# Patient Record
Sex: MALE | Race: WHITE | Employment: FULL TIME | URBAN - METROPOLITAN AREA
[De-identification: names, ages, dates, MRNs, and addresses within clinical notes are randomized per-mention and may not be internally consistent; named-entity substitution may affect disease eponyms.]

---

## 2021-03-05 NOTE — PROGRESS NOTES
The [facility] / [facility] [place] ([facility]) [address] [facility] [address] [facility], [facility] [facility]    Acknowledgment of Informed Consent for Surgical or Medical Procedure and Sedation  I agree to allow doctor(s) Jeana Franco and SEAN Crain his/her associates or assistants, including residents and/or other qualified medical practitioner to perform the following medical treatment or procedure and to administer or direct the administration of sedation as necessary:  Procedure(s): C3-4, C4-5, C5-6, C6-7, T1-2 FUSION AND FIXATION, C6-C7, T1-T2 DECOMPRESSION  My doctor has explained the following regarding the proposed procedure:   the explanation of the procedure   the benefits of the procedure   the potential problems that might occur during recuperation   the risks and side effects of the procedure which could include but are not limited to severe blood loss, infection, stroke or death   the benefits, risks and side effect of alternative procedures including the consequences of declining this procedure or any alternative procedures   the likelihood of achieving satisfactory results. I acknowledge no guarantee or assurance has been made to me regarding the results. I understand that during the course of this treatment/procedure, unforeseen conditions can occur which require an additional or different procedure. I agree to allow my physician or assistants to perform such extension of the original procedure as they may find necessary. I understand that sedation will often result in temporary impairment of memory and fine motor skills and that sedation can occasionally progress to a state of deep sedation or general anesthesia. I understand the risks of anesthesia for surgery include, but are not limited to, sore throat, hoarseness, injury to face, mouth, or teeth; nausea; headache; injury to blood vessels or nerves; death, brain damage, or paralysis.     I understand that if I have a Limitation of Treatment order in effect during my hospitalization, the order may or may not be in effect during this procedure. I give my doctor permission to give me blood or blood products. I understand that there are risks with receiving blood such as hepatitis, AIDS, fever, or allergic reaction. I acknowledge that the risks, benefits, and alternatives of this treatment have been explained to me and that no express or implied warranty has been given by the hospital, any blood bank, or any person or entity as to the blood or blood components transfused. At the discretion of my doctor, I agree to allow observers, equipment/product representatives and allow photographing, and/or televising of the procedure, provided my name or identity is maintained confidentially. I agree the hospital may dispose of or use for scientific or educational purposes any tissue, fluid, or body parts which may be removed.     ________________________________Date________Time______ am/pm  ([place] [facility])  Patient or Signature of Closest Relative or Legal Guardian    ________________________________Date________Time______am/pm      Page 1 of  1  Witness

## 2021-03-09 ENCOUNTER — HOSPITAL ENCOUNTER (OUTPATIENT)
Age: 56
Discharge: HOME | End: 2021-03-09
Payer: COMMERCIAL

## 2021-03-09 PROCEDURE — 93005 ELECTROCARDIOGRAM TRACING: CPT | Performed by: PHYSICIAN ASSISTANT

## 2021-03-10 LAB
EKG ATRIAL RATE: 99 BPM
EKG DIAGNOSIS: NORMAL
EKG P AXIS: 55 DEGREES
EKG P-R INTERVAL: 160 MS
EKG Q-T INTERVAL: 350 MS
EKG QRS DURATION: 88 MS
EKG QTC CALCULATION (BAZETT): 449 MS
EKG R AXIS: 7 DEGREES
EKG T AXIS: 16 DEGREES
EKG VENTRICULAR RATE: 99 BPM

## 2021-03-10 PROCEDURE — 93010 ELECTROCARDIOGRAM REPORT: CPT | Performed by: INTERNAL MEDICINE

## 2021-03-12 ENCOUNTER — OFFICE VISIT (OUTPATIENT)
Dept: PRIMARY CARE CLINIC | Age: 56
End: 2021-03-12
Payer: COMMERCIAL

## 2021-03-12 DIAGNOSIS — Z01.818 PREOP EXAMINATION: Primary | ICD-10-CM

## 2021-03-12 PROCEDURE — 99211 OFF/OP EST MAY X REQ PHY/QHP: CPT | Performed by: NURSE PRACTITIONER

## 2021-03-13 LAB — SARS-COV-2: NOT DETECTED

## 2021-03-15 RX ORDER — LORATADINE 10 MG/1
TABLET ORAL
COMMUNITY
Start: 2021-02-26

## 2021-03-15 RX ORDER — MELOXICAM 15 MG/1
TABLET ORAL
COMMUNITY
Start: 2020-11-25 | End: 2021-03-15

## 2021-03-15 RX ORDER — OXYCODONE HYDROCHLORIDE AND ACETAMINOPHEN 5; 325 MG/1; MG/1
TABLET ORAL
COMMUNITY
Start: 2021-01-15 | End: 2021-03-15

## 2021-03-15 RX ORDER — NICOTINE POLACRILEX 4 MG/1
GUM, CHEWING ORAL
COMMUNITY
Start: 2021-02-26

## 2021-03-15 RX ORDER — M-VIT,TX,IRON,MINS/CALC/FOLIC 27MG-0.4MG
1 TABLET ORAL DAILY
Status: ON HOLD | COMMUNITY
End: 2021-03-21 | Stop reason: HOSPADM

## 2021-03-15 RX ORDER — ACETAMINOPHEN 325 MG/1
325 TABLET ORAL EVERY 4 HOURS
COMMUNITY
End: 2021-03-15

## 2021-03-15 RX ORDER — GABAPENTIN 300 MG/1
300 CAPSULE ORAL 3 TIMES DAILY
COMMUNITY

## 2021-03-15 RX ORDER — MOMETASONE FUROATE 50 UG/1
2 SPRAY, METERED NASAL
Status: ON HOLD | COMMUNITY
End: 2021-03-21 | Stop reason: HOSPADM

## 2021-03-15 RX ORDER — FENOFIBRATE 160 MG/1
160 TABLET ORAL DAILY
COMMUNITY
Start: 2020-05-22

## 2021-03-15 NOTE — PROGRESS NOTES
SPOKE WITH ALETHA AT PCP, AWARE OF LABS, AWARE NEED CLEARANCE FOR LABS.   STATES WILL OBTAIN AND FAX

## 2021-03-15 NOTE — PROGRESS NOTES
[address] [facility] patients having surgery or anesthesia are required to be Covid tested. You will need to quarantine from the time you are tested until your surgery. PRIOR TO PROCEDURE DATE:        1. PLEASE FOLLOW ANY  GUIDELINES/ INSTRUCTIONS PRIOR TO YOUR PROCEDURE AS ADVISED BY YOUR SURGEON. 2. Arrange for someone to drive you home and be with you for the first 24 hours after discharge for your safety after your procedure for which you received sedation. Ensure it is someone we can share information with regarding your discharge. 3. You must contact your surgeon for instructions IF:   You are taking any blood thinners, aspirin, anti-inflammatory or vitamin E.   There is a change in your physical condition such as a cold, fever, rash, cuts, sores or any other infection, especially near your surgical site. 4. Do not drink alcohol the day before or day of your procedure. 5. A Pre-op History and Physical for surgery MUST be completed by your Physician or Urgent Care within 30 days of your procedure date. Please bring a copy with you on the day of your procedure and along with any other testing performed. THE DAY OF YOUR PROCEDURE:  1. Follow instructions for ARRIVAL TIME as DIRECTED BY YOUR SURGEON. I    1. Enter the MAIN entrance from [address] and follow the signs to the free [employer] or [employer] parking (offered free of charge 6am-5pm). 2. Enter the Main Entrance of the hospital (do not enter from the lower level of the parking garage). Upon entrance, check in with the  at the main desk on your left. If no one is available at the desk, proceed into the [facility] Waiting Room and go through the door directly into the [facility]. There is a Check-in desk ACROSS from Room 5 (marked with a sign hanging from the ceiling).  The phone number for the surgery center is [phone]. 4. Please call [phone] option #2 option #2 if you have not been preregistered yet. On the day of your procedure bring your insurance card and photo ID. You will be registered at your bedside once brought back to your room. 5. DO NOT EAT ANYTHING eight hours prior to your arrival for surgery. May have 8 ounces of water 4 hours prior to your arrival for surgery. NOTE: ALL Gastric, Bariatric and Bowel surgery patients MUST follow their surgeon's instructions. 6. MEDICATIONS    Take the following medications with a SMALL sip of water: gabapentin, claritin, omperazole   Use your usual dose of inhalers the morning of surgery. BRING your rescue inhaler with you to hospital.    Anesthesia does NOT want you to take insulin the morning of surgery. They will control your blood sugar while you are at the hospital. Please contact your ordering physician for instructions regarding your insulin the night before your procedure. If you have an insulin pump, please keep it set on basal rate. 7. Do not swallow water when brushing teeth. No gum, candy, mints or ice chips. Refrain from smoking or at least decrease the amount. 8. Dress in loose, comfortable clothing appropriate for redressing after your procedure. Do not wear jewelry (including body piercings), make-up (especially NO eye make-up), fingernail polish (NO toenail polish if foot/leg surgery), lotion, powders or metal hairclips. 9. Dentures, glasses, or contacts will need to be removed before your procedure. Bring cases for your glasses, contacts, dentures, or hearing aids to protect them while you are in surgery. 10. If you use a CPAP, please bring it with you on the day of your procedure. 11. We recommend that valuable personal  belongings such as cash, cell phones, e-tablets or jewelry, be left at home during your stay. The hospital will not be responsible for valuables that are not secured in the hospital safe.  However, if your insurance requires a co-pay, you may want to bring a method of payment, i.e. Check or credit card, if you wish to pay your co-pay the day of surgery. 12. If you are to stay overnight, you may bring a bag with personal items. Please have any large items you may need brought in by your family after your arrival to your hospital room. 15. If you have a Living Will or Durable Power of , please bring a copy on the day of your procedure. 15. With your permission, one family member may accompany you while you are being prepared for surgery. Once you are ready, additional family members may join you. HOW WE KEEP YOU SAFE and WORK TO PREVENT SURGICAL SITE INFECTIONS:  1. Health care workers should always check your ID bracelet to verify your name and birth date. You will be asked many times to state your name, date of birth, and allergies. 2. Health care workers should always clean their hands with soap or alcohol gel before providing care to you. It is okay to ask anyone if they cleaned their hands before they touch you. 3. You will be actively involved in verifying the type of procedure you are having and ensuring the correct surgical site. This will be confirmed multiple times prior to your procedure. Do NOT jeovanny your surgery site UNLESS instructed to by your surgeon. 4. Do not shave or wax for 72 hours prior to procedure near your operative site. Shaving with a razor can irritate your skin and make it easier to develop an infection. On the day of your procedure, any hair that needs to be removed near the surgical site will be clipped by a healthcare worker using a special clippers designed to avoid skin irritation. 5. When you are in the operating room, your surgical site will be cleansed with a special soap, and in most cases, you will be given an antibiotic before the surgery begins. What to expect AFTER YOUR PROCEDURE:  1.  Immediately following your procedure, your will be taken to the PACU for the first phase of your recovery. Your nurse will help you recover from any potential side effects of anesthesia, such as extreme drowsiness, changes in your vital signs or breathing patterns. Nausea, headache, muscle aches, or sore throat may also occur after anesthesia. Your nurse will help you manage these potential side effects. 2. For comfort and safety, arrange to have someone at home with you for the first 24 hours after discharge. 3. You and your family will be given written instructions about your diet, activity, dressing care, medications, and return visits. 4. Once at home, should issues with nausea, pain, or bleeding occur, or should you notice any signs of infection, you should call your surgeon. 5. Always clean your hands before and after caring for your wound. Do not let your family touch your surgery site without cleaning their hands. 6. Narcotic pain medications can cause significant constipation. You may want to add a stool softener to your postoperative medication schedule or speak to your surgeon on how best to manage this SIDE EFFECT. SPECIAL INSTRUCTIONS     Thank you for allowing us to care for you. We strive to exceed your expectations in the delivery of care and service provided to you and your family. If you need to contact the [place] [address] staff for any reason, please call us at [phone]    Instructions reviewed with patient during preadmission testing phone interview. Filippo Ventura. 3/15/2021 .10:15 AM      ADDITIONAL EDUCATIONAL INFORMATION REVIEWED PER PHONE WITH YOU AND/OR YOUR FAMILY:  No Bring a urine sample on day of surgery  Yes Hibiclens® Bathing Instructions   No Antibacterial Soap

## 2021-03-16 NOTE — PROGRESS NOTES
The [facility] / [address] [facility], [address]    Acknowledgment of Informed Consent for Surgical or Medical Procedure and Sedation  I agree to allow doctor(s) Vikas Pickett and his/her associates or assistants, including residents and/or other qualified medical practitioner to perform the following medical treatment or procedure and to administer or direct the administration of sedation as necessary:  Procedure(s): C3-4, C4-5, C5-6, C6-7, T1-T2 FUSION AND FIXATION, C6-C7, T1-T2 DECOMPRESSION      My doctor has explained the following regarding the proposed procedure:   the explanation of the procedure   the benefits of the procedure   the potential problems that might occur during recuperation   the risks and side effects of the procedure which could include but are not limited to severe blood loss, infection, stroke or death   the benefits, risks and side effect of alternative procedures including the consequences of declining this procedure or any alternative procedures   the likelihood of achieving satisfactory results. I acknowledge no guarantee or assurance has been made to me regarding the results. I understand that during the course of this treatment/procedure, unforeseen conditions can occur which require an additional or different procedure. I agree to allow my physician or assistants to perform such extension of the original procedure as they may find necessary. I understand that sedation will often result in temporary impairment of memory and fine motor skills and that sedation can occasionally progress to a state of deep sedation or general anesthesia. I understand the risks of anesthesia for surgery include, but are not limited to, sore throat, hoarseness, injury to face, mouth, or teeth; nausea; headache; injury to blood vessels or nerves; death, brain damage, or paralysis.     I understand that if I have a Limitation of Treatment order in effect during my hospitalization, the order may or may not be in effect during this procedure. I give my doctor permission to give me blood or blood products. I understand that there are risks with receiving blood such as hepatitis, AIDS, fever, or allergic reaction. I acknowledge that the risks, benefits, and alternatives of this treatment have been explained to me and that no express or implied warranty has been given by the hospital, any blood bank, or any person or entity as to the blood or blood components transfused. At the discretion of my doctor, I agree to allow observers, equipment/product representatives and allow photographing, and/or televising of the procedure, provided my name or identity is maintained confidentially. I agree the hospital may dispose of or use for scientific or educational purposes any tissue, fluid, or body parts which may be removed.     ________________________________Date________Time______ am/pm  ([place] [facility])  Patient or Signature of Closest Relative or Legal Guardian    ________________________________Date________Time______am/pm      Page 1 of  1  Witness

## 2021-03-17 ENCOUNTER — ANESTHESIA EVENT (OUTPATIENT)
Dept: OPERATING ROOM | Age: 56
DRG: 460 | End: 2021-03-17
Payer: COMMERCIAL

## 2021-03-18 ENCOUNTER — HOSPITAL ENCOUNTER (INPATIENT)
Age: 56
LOS: 3 days | Discharge: HOME | DRG: 460 | End: 2021-03-21
Attending: NEUROLOGICAL SURGERY | Admitting: NEUROLOGICAL SURGERY
Payer: COMMERCIAL

## 2021-03-18 ENCOUNTER — APPOINTMENT (OUTPATIENT)
Dept: CT IMAGING | Age: 56
DRG: 460 | End: 2021-03-18
Attending: NEUROLOGICAL SURGERY
Payer: COMMERCIAL

## 2021-03-18 ENCOUNTER — ANESTHESIA (OUTPATIENT)
Dept: OPERATING ROOM | Age: 56
DRG: 460 | End: 2021-03-18
Payer: COMMERCIAL

## 2021-03-18 VITALS — OXYGEN SATURATION: 94 % | DIASTOLIC BLOOD PRESSURE: 88 MMHG | TEMPERATURE: 99.1 F | SYSTOLIC BLOOD PRESSURE: 175 MMHG

## 2021-03-18 DIAGNOSIS — Z98.1 S/P CERVICAL SPINAL FUSION: Primary | ICD-10-CM

## 2021-03-18 PROBLEM — M47.22 CERVICAL SPONDYLOSIS WITH RADICULOPATHY: Status: ACTIVE | Noted: 2021-03-18

## 2021-03-18 LAB
ABO/RH: NORMAL
ANTIBODY SCREEN: NORMAL
APTT: 30.7 SEC (ref 24.2–36.2)
INR BLD: 1.09 (ref 0.86–1.14)
PROTHROMBIN TIME: 12.6 SEC (ref 10–13.2)

## 2021-03-18 PROCEDURE — 3700000001 HC ADD 15 MINUTES (ANESTHESIA): Performed by: NEUROLOGICAL SURGERY

## 2021-03-18 PROCEDURE — 2500000003 HC RX 250 WO HCPCS: Performed by: NURSE ANESTHETIST, CERTIFIED REGISTERED

## 2021-03-18 PROCEDURE — 7100000001 HC PACU RECOVERY - ADDTL 15 MIN: Performed by: NEUROLOGICAL SURGERY

## 2021-03-18 PROCEDURE — 86850 RBC ANTIBODY SCREEN: CPT

## 2021-03-18 PROCEDURE — 6360000002 HC RX W HCPCS: Performed by: NEUROLOGICAL SURGERY

## 2021-03-18 PROCEDURE — 2580000003 HC RX 258: Performed by: NEUROLOGICAL SURGERY

## 2021-03-18 PROCEDURE — 0RB30ZZ EXCISION OF CERVICAL VERTEBRAL DISC, OPEN APPROACH: ICD-10-PCS | Performed by: NEUROLOGICAL SURGERY

## 2021-03-18 PROCEDURE — C9359 IMPLNT,BON VOID FILLER-PUTTY: HCPCS | Performed by: NEUROLOGICAL SURGERY

## 2021-03-18 PROCEDURE — C1713 ANCHOR/SCREW BN/BN,TIS/BN: HCPCS | Performed by: NEUROLOGICAL SURGERY

## 2021-03-18 PROCEDURE — 86900 BLOOD TYPING SEROLOGIC ABO: CPT

## 2021-03-18 PROCEDURE — 3600000004 HC SURGERY LEVEL 4 BASE: Performed by: NEUROLOGICAL SURGERY

## 2021-03-18 PROCEDURE — 0RG20K1 FUSION OF 2 OR MORE CERVICAL VERTEBRAL JOINTS WITH NONAUTOLOGOUS TISSUE SUBSTITUTE, POSTERIOR APPROACH, POSTERIOR COLUMN, OPEN APPROACH: ICD-10-PCS | Performed by: NEUROLOGICAL SURGERY

## 2021-03-18 PROCEDURE — 0RG60K1 FUSION OF THORACIC VERTEBRAL JOINT WITH NONAUTOLOGOUS TISSUE SUBSTITUTE, POSTERIOR APPROACH, POSTERIOR COLUMN, OPEN APPROACH: ICD-10-PCS | Performed by: NEUROLOGICAL SURGERY

## 2021-03-18 PROCEDURE — 0RG40K1 FUSION OF CERVICOTHORACIC VERTEBRAL JOINT WITH NONAUTOLOGOUS TISSUE SUBSTITUTE, POSTERIOR APPROACH, POSTERIOR COLUMN, OPEN APPROACH: ICD-10-PCS | Performed by: NEUROLOGICAL SURGERY

## 2021-03-18 PROCEDURE — 2580000003 HC RX 258: Performed by: ANESTHESIOLOGY

## 2021-03-18 PROCEDURE — 2580000003 HC RX 258: Performed by: NURSE ANESTHETIST, CERTIFIED REGISTERED

## 2021-03-18 PROCEDURE — C9290 INJ, BUPIVACAINE LIPOSOME: HCPCS | Performed by: NEUROLOGICAL SURGERY

## 2021-03-18 PROCEDURE — 00NW0ZZ RELEASE CERVICAL SPINAL CORD, OPEN APPROACH: ICD-10-PCS | Performed by: NEUROLOGICAL SURGERY

## 2021-03-18 PROCEDURE — 77011 CT SCAN FOR LOCALIZATION: CPT

## 2021-03-18 PROCEDURE — 2720000010 HC SURG SUPPLY STERILE: Performed by: NEUROLOGICAL SURGERY

## 2021-03-18 PROCEDURE — 6360000002 HC RX W HCPCS: Performed by: NURSE ANESTHETIST, CERTIFIED REGISTERED

## 2021-03-18 PROCEDURE — 7100000000 HC PACU RECOVERY - FIRST 15 MIN: Performed by: NEUROLOGICAL SURGERY

## 2021-03-18 PROCEDURE — 1200000000 HC SEMI PRIVATE

## 2021-03-18 PROCEDURE — 01N10ZZ RELEASE CERVICAL NERVE, OPEN APPROACH: ICD-10-PCS | Performed by: NEUROLOGICAL SURGERY

## 2021-03-18 PROCEDURE — 3700000000 HC ANESTHESIA ATTENDED CARE: Performed by: NEUROLOGICAL SURGERY

## 2021-03-18 PROCEDURE — 85610 PROTHROMBIN TIME: CPT

## 2021-03-18 PROCEDURE — 3600000014 HC SURGERY LEVEL 4 ADDTL 15MIN: Performed by: NEUROLOGICAL SURGERY

## 2021-03-18 PROCEDURE — 2709999900 HC NON-CHARGEABLE SUPPLY: Performed by: NEUROLOGICAL SURGERY

## 2021-03-18 PROCEDURE — 86901 BLOOD TYPING SEROLOGIC RH(D): CPT

## 2021-03-18 PROCEDURE — 85730 THROMBOPLASTIN TIME PARTIAL: CPT

## 2021-03-18 PROCEDURE — 2500000003 HC RX 250 WO HCPCS: Performed by: NEUROLOGICAL SURGERY

## 2021-03-18 PROCEDURE — 6370000000 HC RX 637 (ALT 250 FOR IP): Performed by: NEUROLOGICAL SURGERY

## 2021-03-18 PROCEDURE — 94660 CPAP INITIATION&MGMT: CPT

## 2021-03-18 PROCEDURE — 6360000002 HC RX W HCPCS: Performed by: ANESTHESIOLOGY

## 2021-03-18 DEVICE — SCREW SPINAL F/SYMPHONY OCT SYSTEM: Type: IMPLANTABLE DEVICE | Site: SPINE CERVICAL | Status: FUNCTIONAL

## 2021-03-18 DEVICE — SCREW SPNL 3.5X14MM SYMPHONY: Type: IMPLANTABLE DEVICE | Site: SPINE CERVICAL | Status: FUNCTIONAL

## 2021-03-18 DEVICE — SCREW SPNL POLYAX 4.5X32 MM OCT FOR 4 MM ROD NS SYM: Type: IMPLANTABLE DEVICE | Site: SPINE CERVICAL | Status: FUNCTIONAL

## 2021-03-18 DEVICE — IMPLANTABLE DEVICE: Type: IMPLANTABLE DEVICE | Site: SPINE CERVICAL | Status: FUNCTIONAL

## 2021-03-18 DEVICE — BONE GRFT SUB L 12.5CC BIOACTIVE GLS MTRX: Type: IMPLANTABLE DEVICE | Site: SPINE CERVICAL | Status: FUNCTIONAL

## 2021-03-18 RX ORDER — CEFAZOLIN SODIUM 2 G/50ML
2000 SOLUTION INTRAVENOUS ONCE
Status: COMPLETED | OUTPATIENT
Start: 2021-03-18 | End: 2021-03-18

## 2021-03-18 RX ORDER — OXYCODONE HYDROCHLORIDE 5 MG/1
10 TABLET ORAL EVERY 4 HOURS PRN
Status: DISCONTINUED | OUTPATIENT
Start: 2021-03-18 | End: 2021-03-21 | Stop reason: HOSPADM

## 2021-03-18 RX ORDER — LABETALOL HYDROCHLORIDE 5 MG/ML
5 INJECTION, SOLUTION INTRAVENOUS EVERY 10 MIN PRN
Status: DISCONTINUED | OUTPATIENT
Start: 2021-03-18 | End: 2021-03-18 | Stop reason: HOSPADM

## 2021-03-18 RX ORDER — FENTANYL CITRATE 50 UG/ML
50 INJECTION, SOLUTION INTRAMUSCULAR; INTRAVENOUS EVERY 5 MIN PRN
Status: DISCONTINUED | OUTPATIENT
Start: 2021-03-18 | End: 2021-03-18 | Stop reason: HOSPADM

## 2021-03-18 RX ORDER — PROMETHAZINE HYDROCHLORIDE 12.5 MG/1
12.5 TABLET ORAL EVERY 6 HOURS PRN
Status: DISCONTINUED | OUTPATIENT
Start: 2021-03-18 | End: 2021-03-21 | Stop reason: HOSPADM

## 2021-03-18 RX ORDER — OXYCODONE HYDROCHLORIDE AND ACETAMINOPHEN 5; 325 MG/1; MG/1
2 TABLET ORAL PRN
Status: DISCONTINUED | OUTPATIENT
Start: 2021-03-18 | End: 2021-03-18 | Stop reason: HOSPADM

## 2021-03-18 RX ORDER — REMIFENTANIL HYDROCHLORIDE 1 MG/ML
INJECTION, POWDER, LYOPHILIZED, FOR SOLUTION INTRAVENOUS CONTINUOUS PRN
Status: DISCONTINUED | OUTPATIENT
Start: 2021-03-18 | End: 2021-03-18 | Stop reason: SDUPTHER

## 2021-03-18 RX ORDER — LIDOCAINE HYDROCHLORIDE 10 MG/ML
1 INJECTION, SOLUTION EPIDURAL; INFILTRATION; INTRACAUDAL; PERINEURAL
Status: DISCONTINUED | OUTPATIENT
Start: 2021-03-18 | End: 2021-03-18 | Stop reason: HOSPADM

## 2021-03-18 RX ORDER — ONDANSETRON 2 MG/ML
4 INJECTION INTRAMUSCULAR; INTRAVENOUS
Status: DISCONTINUED | OUTPATIENT
Start: 2021-03-18 | End: 2021-03-18 | Stop reason: HOSPADM

## 2021-03-18 RX ORDER — PANTOPRAZOLE SODIUM 40 MG/1
40 TABLET, DELAYED RELEASE ORAL
Status: DISCONTINUED | OUTPATIENT
Start: 2021-03-19 | End: 2021-03-21 | Stop reason: HOSPADM

## 2021-03-18 RX ORDER — OXYCODONE HYDROCHLORIDE 5 MG/1
5 TABLET ORAL EVERY 4 HOURS PRN
Status: DISCONTINUED | OUTPATIENT
Start: 2021-03-18 | End: 2021-03-21 | Stop reason: HOSPADM

## 2021-03-18 RX ORDER — SODIUM CHLORIDE 9 MG/ML
INJECTION, SOLUTION INTRAVENOUS CONTINUOUS
Status: DISCONTINUED | OUTPATIENT
Start: 2021-03-18 | End: 2021-03-19

## 2021-03-18 RX ORDER — METHOCARBAMOL 750 MG/1
750 TABLET, FILM COATED ORAL EVERY 6 HOURS PRN
Status: DISCONTINUED | OUTPATIENT
Start: 2021-03-19 | End: 2021-03-19

## 2021-03-18 RX ORDER — FENTANYL CITRATE 50 UG/ML
25 INJECTION, SOLUTION INTRAMUSCULAR; INTRAVENOUS EVERY 5 MIN PRN
Status: DISCONTINUED | OUTPATIENT
Start: 2021-03-18 | End: 2021-03-18 | Stop reason: HOSPADM

## 2021-03-18 RX ORDER — PROPOFOL 10 MG/ML
INJECTION, EMULSION INTRAVENOUS CONTINUOUS PRN
Status: DISCONTINUED | OUTPATIENT
Start: 2021-03-18 | End: 2021-03-18 | Stop reason: SDUPTHER

## 2021-03-18 RX ORDER — VANCOMYCIN HYDROCHLORIDE 1 G/20ML
INJECTION, POWDER, LYOPHILIZED, FOR SOLUTION INTRAVENOUS PRN
Status: DISCONTINUED | OUTPATIENT
Start: 2021-03-18 | End: 2021-03-18 | Stop reason: ALTCHOICE

## 2021-03-18 RX ORDER — DIPHENHYDRAMINE HYDROCHLORIDE 50 MG/ML
12.5 INJECTION INTRAMUSCULAR; INTRAVENOUS
Status: DISCONTINUED | OUTPATIENT
Start: 2021-03-18 | End: 2021-03-18 | Stop reason: HOSPADM

## 2021-03-18 RX ORDER — CITALOPRAM 40 MG/1
40 TABLET ORAL DAILY
Status: DISCONTINUED | OUTPATIENT
Start: 2021-03-19 | End: 2021-03-21 | Stop reason: HOSPADM

## 2021-03-18 RX ORDER — HYDRALAZINE HYDROCHLORIDE 20 MG/ML
5 INJECTION INTRAMUSCULAR; INTRAVENOUS EVERY 10 MIN PRN
Status: DISCONTINUED | OUTPATIENT
Start: 2021-03-18 | End: 2021-03-18 | Stop reason: HOSPADM

## 2021-03-18 RX ORDER — POLYETHYLENE GLYCOL 3350 17 G/17G
17 POWDER, FOR SOLUTION ORAL DAILY
Status: DISCONTINUED | OUTPATIENT
Start: 2021-03-18 | End: 2021-03-21 | Stop reason: HOSPADM

## 2021-03-18 RX ORDER — METOPROLOL TARTRATE 5 MG/5ML
INJECTION INTRAVENOUS PRN
Status: DISCONTINUED | OUTPATIENT
Start: 2021-03-18 | End: 2021-03-18 | Stop reason: SDUPTHER

## 2021-03-18 RX ORDER — SODIUM CHLORIDE 0.9 % (FLUSH) 0.9 %
10 SYRINGE (ML) INJECTION EVERY 12 HOURS SCHEDULED
Status: DISCONTINUED | OUTPATIENT
Start: 2021-03-18 | End: 2021-03-18 | Stop reason: HOSPADM

## 2021-03-18 RX ORDER — SODIUM CHLORIDE, SODIUM LACTATE, POTASSIUM CHLORIDE, CALCIUM CHLORIDE 600; 310; 30; 20 MG/100ML; MG/100ML; MG/100ML; MG/100ML
INJECTION, SOLUTION INTRAVENOUS CONTINUOUS
Status: DISCONTINUED | OUTPATIENT
Start: 2021-03-18 | End: 2021-03-18

## 2021-03-18 RX ORDER — ONDANSETRON 2 MG/ML
INJECTION INTRAMUSCULAR; INTRAVENOUS PRN
Status: DISCONTINUED | OUTPATIENT
Start: 2021-03-18 | End: 2021-03-18 | Stop reason: SDUPTHER

## 2021-03-18 RX ORDER — ROCURONIUM BROMIDE 10 MG/ML
INJECTION, SOLUTION INTRAVENOUS PRN
Status: DISCONTINUED | OUTPATIENT
Start: 2021-03-18 | End: 2021-03-18 | Stop reason: SDUPTHER

## 2021-03-18 RX ORDER — SODIUM CHLORIDE, SODIUM LACTATE, POTASSIUM CHLORIDE, CALCIUM CHLORIDE 600; 310; 30; 20 MG/100ML; MG/100ML; MG/100ML; MG/100ML
INJECTION, SOLUTION INTRAVENOUS CONTINUOUS PRN
Status: DISCONTINUED | OUTPATIENT
Start: 2021-03-18 | End: 2021-03-18 | Stop reason: SDUPTHER

## 2021-03-18 RX ORDER — MEPERIDINE HYDROCHLORIDE 25 MG/ML
12.5 INJECTION INTRAMUSCULAR; INTRAVENOUS; SUBCUTANEOUS EVERY 5 MIN PRN
Status: DISCONTINUED | OUTPATIENT
Start: 2021-03-18 | End: 2021-03-18 | Stop reason: HOSPADM

## 2021-03-18 RX ORDER — FENTANYL CITRATE 50 UG/ML
INJECTION, SOLUTION INTRAMUSCULAR; INTRAVENOUS PRN
Status: DISCONTINUED | OUTPATIENT
Start: 2021-03-18 | End: 2021-03-18 | Stop reason: SDUPTHER

## 2021-03-18 RX ORDER — SENNA AND DOCUSATE SODIUM 50; 8.6 MG/1; MG/1
1 TABLET, FILM COATED ORAL 2 TIMES DAILY
Status: DISCONTINUED | OUTPATIENT
Start: 2021-03-18 | End: 2021-03-21 | Stop reason: HOSPADM

## 2021-03-18 RX ORDER — PROCHLORPERAZINE EDISYLATE 5 MG/ML
5 INJECTION INTRAMUSCULAR; INTRAVENOUS
Status: DISCONTINUED | OUTPATIENT
Start: 2021-03-18 | End: 2021-03-18 | Stop reason: HOSPADM

## 2021-03-18 RX ORDER — SODIUM CHLORIDE 0.9 % (FLUSH) 0.9 %
10 SYRINGE (ML) INJECTION PRN
Status: DISCONTINUED | OUTPATIENT
Start: 2021-03-18 | End: 2021-03-18 | Stop reason: HOSPADM

## 2021-03-18 RX ORDER — ONDANSETRON 2 MG/ML
4 INJECTION INTRAMUSCULAR; INTRAVENOUS EVERY 6 HOURS PRN
Status: DISCONTINUED | OUTPATIENT
Start: 2021-03-18 | End: 2021-03-21 | Stop reason: HOSPADM

## 2021-03-18 RX ORDER — BISACODYL 10 MG
10 SUPPOSITORY, RECTAL RECTAL DAILY PRN
Status: DISCONTINUED | OUTPATIENT
Start: 2021-03-18 | End: 2021-03-21 | Stop reason: HOSPADM

## 2021-03-18 RX ORDER — SODIUM CHLORIDE 0.9 % (FLUSH) 0.9 %
10 SYRINGE (ML) INJECTION PRN
Status: DISCONTINUED | OUTPATIENT
Start: 2021-03-18 | End: 2021-03-21 | Stop reason: HOSPADM

## 2021-03-18 RX ORDER — SUCCINYLCHOLINE/SOD CL,ISO/PF 200MG/10ML
SYRINGE (ML) INTRAVENOUS PRN
Status: DISCONTINUED | OUTPATIENT
Start: 2021-03-18 | End: 2021-03-18 | Stop reason: SDUPTHER

## 2021-03-18 RX ORDER — HYDROMORPHONE HCL 110MG/55ML
PATIENT CONTROLLED ANALGESIA SYRINGE INTRAVENOUS PRN
Status: DISCONTINUED | OUTPATIENT
Start: 2021-03-18 | End: 2021-03-18 | Stop reason: SDUPTHER

## 2021-03-18 RX ORDER — LIDOCAINE HYDROCHLORIDE 20 MG/ML
INJECTION, SOLUTION INTRAVENOUS PRN
Status: DISCONTINUED | OUTPATIENT
Start: 2021-03-18 | End: 2021-03-18 | Stop reason: SDUPTHER

## 2021-03-18 RX ORDER — DIAZEPAM 5 MG/1
5 TABLET ORAL EVERY 6 HOURS PRN
Status: DISCONTINUED | OUTPATIENT
Start: 2021-03-18 | End: 2021-03-21 | Stop reason: HOSPADM

## 2021-03-18 RX ORDER — PROPOFOL 10 MG/ML
INJECTION, EMULSION INTRAVENOUS PRN
Status: DISCONTINUED | OUTPATIENT
Start: 2021-03-18 | End: 2021-03-18 | Stop reason: SDUPTHER

## 2021-03-18 RX ORDER — FLUTICASONE PROPIONATE 50 MCG
1 SPRAY, SUSPENSION (ML) NASAL DAILY
Status: DISCONTINUED | OUTPATIENT
Start: 2021-03-18 | End: 2021-03-21 | Stop reason: HOSPADM

## 2021-03-18 RX ORDER — MIDAZOLAM HYDROCHLORIDE 1 MG/ML
INJECTION INTRAMUSCULAR; INTRAVENOUS PRN
Status: DISCONTINUED | OUTPATIENT
Start: 2021-03-18 | End: 2021-03-18 | Stop reason: SDUPTHER

## 2021-03-18 RX ORDER — HEPARIN SODIUM 5000 [USP'U]/ML
5000 INJECTION, SOLUTION INTRAVENOUS; SUBCUTANEOUS EVERY 8 HOURS
Status: DISCONTINUED | OUTPATIENT
Start: 2021-03-19 | End: 2021-03-21 | Stop reason: HOSPADM

## 2021-03-18 RX ORDER — ALBUTEROL SULFATE 2.5 MG/3ML
SOLUTION RESPIRATORY (INHALATION) PRN
Status: DISCONTINUED | OUTPATIENT
Start: 2021-03-18 | End: 2021-03-18 | Stop reason: SDUPTHER

## 2021-03-18 RX ORDER — SODIUM CHLORIDE 0.9 % (FLUSH) 0.9 %
10 SYRINGE (ML) INJECTION EVERY 12 HOURS SCHEDULED
Status: DISCONTINUED | OUTPATIENT
Start: 2021-03-18 | End: 2021-03-21 | Stop reason: HOSPADM

## 2021-03-18 RX ORDER — DEXAMETHASONE SODIUM PHOSPHATE 4 MG/ML
INJECTION, SOLUTION INTRA-ARTICULAR; INTRALESIONAL; INTRAMUSCULAR; INTRAVENOUS; SOFT TISSUE PRN
Status: DISCONTINUED | OUTPATIENT
Start: 2021-03-18 | End: 2021-03-18 | Stop reason: SDUPTHER

## 2021-03-18 RX ORDER — GABAPENTIN 300 MG/1
300 CAPSULE ORAL 3 TIMES DAILY
Status: DISCONTINUED | OUTPATIENT
Start: 2021-03-18 | End: 2021-03-21 | Stop reason: HOSPADM

## 2021-03-18 RX ORDER — CITALOPRAM 40 MG/1
40 TABLET ORAL DAILY
COMMUNITY
Start: 2020-05-22

## 2021-03-18 RX ORDER — OXYCODONE HYDROCHLORIDE AND ACETAMINOPHEN 5; 325 MG/1; MG/1
1 TABLET ORAL PRN
Status: DISCONTINUED | OUTPATIENT
Start: 2021-03-18 | End: 2021-03-18 | Stop reason: HOSPADM

## 2021-03-18 RX ORDER — ACETAMINOPHEN 325 MG/1
650 TABLET ORAL EVERY 4 HOURS PRN
Status: DISCONTINUED | OUTPATIENT
Start: 2021-03-18 | End: 2021-03-21 | Stop reason: HOSPADM

## 2021-03-18 RX ORDER — LIDOCAINE HYDROCHLORIDE ANHYDROUS AND DEXTROSE MONOHYDRATE .4; 5 G/100ML; G/100ML
INJECTION, SOLUTION INTRAVENOUS CONTINUOUS PRN
Status: DISCONTINUED | OUTPATIENT
Start: 2021-03-18 | End: 2021-03-18 | Stop reason: SDUPTHER

## 2021-03-18 RX ADMIN — OXYCODONE 10 MG: 5 TABLET ORAL at 23:25

## 2021-03-18 RX ADMIN — PROPOFOL 20 MG: 10 INJECTION, EMULSION INTRAVENOUS at 14:19

## 2021-03-18 RX ADMIN — FENTANYL CITRATE 100 MCG: 50 INJECTION, SOLUTION INTRAMUSCULAR; INTRAVENOUS at 15:18

## 2021-03-18 RX ADMIN — POLYETHYLENE GLYCOL (3350) 17 G: 17 POWDER, FOR SOLUTION ORAL at 19:38

## 2021-03-18 RX ADMIN — SODIUM CHLORIDE: 9 INJECTION, SOLUTION INTRAVENOUS at 19:43

## 2021-03-18 RX ADMIN — OXYCODONE 10 MG: 5 TABLET ORAL at 19:31

## 2021-03-18 RX ADMIN — HYDROMORPHONE HYDROCHLORIDE 1 MG: 2 INJECTION, SOLUTION INTRAMUSCULAR; INTRAVENOUS; SUBCUTANEOUS at 13:58

## 2021-03-18 RX ADMIN — FENTANYL CITRATE 100 MCG: 50 INJECTION, SOLUTION INTRAMUSCULAR; INTRAVENOUS at 13:47

## 2021-03-18 RX ADMIN — PROPOFOL 200 MG: 10 INJECTION, EMULSION INTRAVENOUS at 13:40

## 2021-03-18 RX ADMIN — ROCURONIUM BROMIDE 5 MG: 10 INJECTION INTRAVENOUS at 13:40

## 2021-03-18 RX ADMIN — METOPROLOL TARTRATE 2.5 MG: 5 INJECTION INTRAVENOUS at 17:05

## 2021-03-18 RX ADMIN — HYDROMORPHONE HYDROCHLORIDE 0.5 MG: 1 INJECTION, SOLUTION INTRAMUSCULAR; INTRAVENOUS; SUBCUTANEOUS at 17:27

## 2021-03-18 RX ADMIN — METHOCARBAMOL 1000 MG: 100 INJECTION, SOLUTION INTRAMUSCULAR; INTRAVENOUS at 17:16

## 2021-03-18 RX ADMIN — METHOCARBAMOL 1000 MG: 100 INJECTION, SOLUTION INTRAMUSCULAR; INTRAVENOUS at 23:25

## 2021-03-18 RX ADMIN — HYDROMORPHONE HYDROCHLORIDE 1 MG: 2 INJECTION, SOLUTION INTRAMUSCULAR; INTRAVENOUS; SUBCUTANEOUS at 13:52

## 2021-03-18 RX ADMIN — MIDAZOLAM HYDROCHLORIDE 2 MG: 2 INJECTION, SOLUTION INTRAMUSCULAR; INTRAVENOUS at 13:30

## 2021-03-18 RX ADMIN — LIDOCAINE HYDROCHLORIDE ANHYDROUS AND DEXTROSE MONOHYDRATE 3 MG/MIN: .4; 5 INJECTION, SOLUTION INTRAVENOUS at 14:52

## 2021-03-18 RX ADMIN — PROPOFOL 50 MG: 10 INJECTION, EMULSION INTRAVENOUS at 13:50

## 2021-03-18 RX ADMIN — GABAPENTIN 300 MG: 300 CAPSULE ORAL at 19:32

## 2021-03-18 RX ADMIN — ONDANSETRON 4 MG: 2 INJECTION INTRAMUSCULAR; INTRAVENOUS at 16:47

## 2021-03-18 RX ADMIN — SODIUM CHLORIDE, SODIUM LACTATE, POTASSIUM CHLORIDE, AND CALCIUM CHLORIDE: .6; .31; .03; .02 INJECTION, SOLUTION INTRAVENOUS at 13:32

## 2021-03-18 RX ADMIN — LIDOCAINE HYDROCHLORIDE 100 MG: 20 INJECTION, SOLUTION INTRAVENOUS at 13:40

## 2021-03-18 RX ADMIN — CEFAZOLIN SODIUM 3 G: 2 SOLUTION INTRAVENOUS at 14:05

## 2021-03-18 RX ADMIN — DEXAMETHASONE SODIUM PHOSPHATE 10 MG: 4 INJECTION, SOLUTION INTRAMUSCULAR; INTRAVENOUS at 14:20

## 2021-03-18 RX ADMIN — SUGAMMADEX 200 MG: 100 INJECTION, SOLUTION INTRAVENOUS at 14:55

## 2021-03-18 RX ADMIN — ALBUTEROL SULFATE 2.5 MG: 2.5 SOLUTION RESPIRATORY (INHALATION) at 16:48

## 2021-03-18 RX ADMIN — PROPOFOL 30 MG: 10 INJECTION, EMULSION INTRAVENOUS at 13:58

## 2021-03-18 RX ADMIN — REMIFENTANIL HYDROCHLORIDE 0.1 MCG/KG/MIN: 1 INJECTION, POWDER, LYOPHILIZED, FOR SOLUTION INTRAVENOUS at 13:43

## 2021-03-18 RX ADMIN — SODIUM CHLORIDE, POTASSIUM CHLORIDE, SODIUM LACTATE AND CALCIUM CHLORIDE: 600; 310; 30; 20 INJECTION, SOLUTION INTRAVENOUS at 13:32

## 2021-03-18 RX ADMIN — SODIUM CHLORIDE, POTASSIUM CHLORIDE, SODIUM LACTATE AND CALCIUM CHLORIDE: 600; 310; 30; 20 INJECTION, SOLUTION INTRAVENOUS at 10:44

## 2021-03-18 RX ADMIN — PROPOFOL 50 MG: 10 INJECTION, EMULSION INTRAVENOUS at 13:41

## 2021-03-18 RX ADMIN — HYDROMORPHONE HYDROCHLORIDE 1 MG: 1 INJECTION, SOLUTION INTRAMUSCULAR; INTRAVENOUS; SUBCUTANEOUS at 20:46

## 2021-03-18 RX ADMIN — PROPOFOL 50 MG: 10 INJECTION, EMULSION INTRAVENOUS at 13:45

## 2021-03-18 RX ADMIN — PROPOFOL 125 MCG/KG/MIN: 10 INJECTION, EMULSION INTRAVENOUS at 13:44

## 2021-03-18 RX ADMIN — Medication 160 MG: at 13:41

## 2021-03-18 RX ADMIN — DOCUSATE SODIUM 50 MG AND SENNOSIDES 8.6 MG 1 TABLET: 8.6; 5 TABLET, FILM COATED ORAL at 19:32

## 2021-03-18 RX ADMIN — DIAZEPAM 5 MG: 5 TABLET ORAL at 19:32

## 2021-03-18 RX ADMIN — ROCURONIUM BROMIDE 40 MG: 10 INJECTION INTRAVENOUS at 14:14

## 2021-03-18 RX ADMIN — FENTANYL CITRATE 100 MCG: 50 INJECTION, SOLUTION INTRAMUSCULAR; INTRAVENOUS at 13:39

## 2021-03-18 ASSESSMENT — PULMONARY FUNCTION TESTS
PIF_VALUE: 17
PIF_VALUE: 30
PIF_VALUE: 7
PIF_VALUE: 33
PIF_VALUE: 30
PIF_VALUE: 27
PIF_VALUE: 33
PIF_VALUE: 14
PIF_VALUE: 33
PIF_VALUE: 30
PIF_VALUE: 2
PIF_VALUE: 33
PIF_VALUE: 19
PIF_VALUE: 1
PIF_VALUE: 3
PIF_VALUE: 28
PIF_VALUE: 27
PIF_VALUE: 32
PIF_VALUE: 39
PIF_VALUE: 29
PIF_VALUE: 28
PIF_VALUE: 33
PIF_VALUE: 15
PIF_VALUE: 30
PIF_VALUE: 30
PIF_VALUE: 29
PIF_VALUE: 0
PIF_VALUE: 30
PIF_VALUE: 28
PIF_VALUE: 20
PIF_VALUE: 33
PIF_VALUE: 0
PIF_VALUE: 4
PIF_VALUE: 30
PIF_VALUE: 33
PIF_VALUE: 29
PIF_VALUE: 33
PIF_VALUE: 29
PIF_VALUE: 33
PIF_VALUE: 28
PIF_VALUE: 33
PIF_VALUE: 32
PIF_VALUE: 32
PIF_VALUE: 30
PIF_VALUE: 33
PIF_VALUE: 14
PIF_VALUE: 21
PIF_VALUE: 28
PIF_VALUE: 30
PIF_VALUE: 5
PIF_VALUE: 33
PIF_VALUE: 21
PIF_VALUE: 29
PIF_VALUE: 33
PIF_VALUE: 30
PIF_VALUE: 33
PIF_VALUE: 33
PIF_VALUE: 0
PIF_VALUE: 30
PIF_VALUE: 5
PIF_VALUE: 28
PIF_VALUE: 30
PIF_VALUE: 29
PIF_VALUE: 30
PIF_VALUE: 30
PIF_VALUE: 28
PIF_VALUE: 30
PIF_VALUE: 12
PIF_VALUE: 33
PIF_VALUE: 28
PIF_VALUE: 30
PIF_VALUE: 21
PIF_VALUE: 33
PIF_VALUE: 28
PIF_VALUE: 26
PIF_VALUE: 12
PIF_VALUE: 30
PIF_VALUE: 33
PIF_VALUE: 29
PIF_VALUE: 9
PIF_VALUE: 30
PIF_VALUE: 30
PIF_VALUE: 33
PIF_VALUE: 33
PIF_VALUE: 0
PIF_VALUE: 30
PIF_VALUE: 33
PIF_VALUE: 32
PIF_VALUE: 33
PIF_VALUE: 28
PIF_VALUE: 0
PIF_VALUE: 33
PIF_VALUE: 29
PIF_VALUE: 33

## 2021-03-18 ASSESSMENT — PAIN DESCRIPTION - ORIENTATION
ORIENTATION: MID
ORIENTATION: MID

## 2021-03-18 ASSESSMENT — PAIN DESCRIPTION - LOCATION: LOCATION: NECK

## 2021-03-18 ASSESSMENT — PAIN DESCRIPTION - ONSET
ONSET: ON-GOING

## 2021-03-18 ASSESSMENT — PAIN SCALES - GENERAL
PAINLEVEL_OUTOF10: 0
PAINLEVEL_OUTOF10: 7
PAINLEVEL_OUTOF10: 8
PAINLEVEL_OUTOF10: 10

## 2021-03-18 ASSESSMENT — PAIN DESCRIPTION - FREQUENCY
FREQUENCY: CONTINUOUS

## 2021-03-18 ASSESSMENT — PAIN - FUNCTIONAL ASSESSMENT
PAIN_FUNCTIONAL_ASSESSMENT: ACTIVITIES ARE NOT PREVENTED
PAIN_FUNCTIONAL_ASSESSMENT: 0-10
PAIN_FUNCTIONAL_ASSESSMENT: ACTIVITIES ARE NOT PREVENTED

## 2021-03-18 ASSESSMENT — PAIN DESCRIPTION - PROGRESSION
CLINICAL_PROGRESSION: NOT CHANGED
CLINICAL_PROGRESSION: GRADUALLY WORSENING

## 2021-03-18 ASSESSMENT — PAIN DESCRIPTION - PAIN TYPE: TYPE: ACUTE PAIN

## 2021-03-18 NOTE — H&P
Vic Purchase    [id number]    [facility] Same Day Surgery Update H & P  Department of General Surgery   Surgical Service   Pre-operative History and Physical  Last H & P within the last 30 days. DIAGNOSIS:   Displacement of intervertebral disc at C6-C7 level [M50.223]    Procedure(s):  C3-4, C4-5, C5-6, C6-7, T1-T2 FUSION AND FIXATION, C6-C7, T1-T2 DECOMPRESSION     HISTORY OF PRESENT ILLNESS:   Patient is a morbidly obese (Body mass index is 40.45 kg/m².), 48 y.o. male with chronic cervical pain and left UE pain,numbness and tingling. The symptoms have been recalcitrant to conservative treatment and the patient presents today for the above procedure. Covid 19:  Patient denies fever, chills, cough or known exposure to Covid-19.       Past Medical History:        Diagnosis Date    Hyperlipidemia     Sleep apnea     uses cpap     Past Surgical History:        Procedure Laterality Date    BACK SURGERY  2018    fusion  c5-6    HERNIA REPAIR       Past Social History:  Social History     Socioeconomic History    Marital status:      Spouse name: None    Number of children: None    Years of education: None    Highest education level: None   Occupational History    None   Social Needs    Financial resource strain: None    Food insecurity     Worry: None     Inability: None    Transportation needs     Medical: None     Non-medical: None   Tobacco Use    Smoking status: Never Smoker    Smokeless tobacco: Never Used   Substance and Sexual Activity    Alcohol use: Yes     Comment: occ    Drug use: Not Currently    Sexual activity: None   Lifestyle    Physical activity     Days per week: None     Minutes per session: None    Stress: None   Relationships    Social connections     Talks on phone: None     Gets together: None     Attends Faith service: None     Active member of club or organization: None     Attends meetings of clubs or organizations: None     Relationship status: None    Intimate partner violence     Fear of current or ex partner: None     Emotionally abused: None     Physically abused: None     Forced sexual activity: None   Other Topics Concern    None   Social History Narrative    None         Medications Prior to Admission:      Prior to Admission medications    Medication Sig Start Date End Date Taking? Authorizing Provider   loratadine (CLARITIN) 10 MG tablet CLARITIN TABS 2/26/21  Yes Historical Provider, MD   gabapentin (NEURONTIN) 300 MG capsule Take 300 mg by mouth 3 times daily. Yes Historical Provider, MD   fenofibrate (TRIGLIDE) 160 MG tablet Take 160 mg by mouth daily 5/22/20  Yes Historical Provider, MD   mometasone (NASONEX) 50 MCG/ACT nasal spray 2 sprays by Nasal route   Yes Historical Provider, MD   omeprazole ([facility] OMEPRAZOLE) 20 MG EC tablet [facility]S OMEPRAZOLE 20 MG TBEC 2/26/21  Yes Historical Provider, MD   Multiple Vitamins-Minerals (THERAPEUTIC MULTIVITAMIN-MINERALS) tablet Take 1 tablet by mouth daily   Yes Historical Provider, MD         Allergies:  Simvastatin    PHYSICAL EXAM:      /85   Pulse 78   Temp 98.1 °F (36.7 °C) (Oral)   Resp 18   Ht 5' 11\" (1.803 m)   Wt 290 lb (131.5 kg)   SpO2 95%   BMI 40.45 kg/m²      Airway:  Airway patent with no audible stridor    Heart:  Regular rate and rhythm, No murmur noted    Lungs:  No increased work of breathing, good air exchange, clear to auscultation bilaterally, no crackles or wheezing    Abdomen:  Soft, non-distended, non-tender, normal active bowel sounds, no masses palpated    ASSESSMENT AND PLAN    Patient is a 48 y.o. male with above specified procedure planned. 1.  The patients history and physical was obtained and signed off by the pre-admission testing department. Patient seen and focused exam done today- no new changes since last physical exam on 3/5/21    2. Access to ancillary services are available per request of the provider.     Leonora Schirmer, ENRIQUE - CNP 3/18/2021

## 2021-03-18 NOTE — OP NOTE
induced and the patient was intubated with careful attention to not extend the patients neck. A Ley catheter was placed. Neuromonitoring leads (SSEPs, MEPs, EMG) were placed. After pre-positioning baselines were obtained, the patient was placed in [place] 3-pin fixation and positioned prone onto chest rolls. All pressure points were appropriately padded. The shoulders was pulled down and away using silk tape. A linear incision spanning C3-T2 levels was planned and shaved. Intravenous antibiotics (Ancef) and Decadron were given by anesthesia. Strict attention was paid to maintain systolic blood pressure above 120mm Hg and avoid hypotension. A time out was completed and the surgical site was prepped and draped in the usual sterile fashion. After infiltration of the skin with 1% Lidocaine with 1:100,000 Epinephrine, the incision was made with a #15 scalpel. Bovie electrocautery was then used to complete the dissection down to the fascia which was split at the midline. Subperiosteal dissection was undertaken to fully expose the lamina and spinous processes of C3 to T2 bilaterally. Dissection was carried out to the lateral edges of the lateral masses bilaterally. Angled Weitlaner retractors were inserted. The image guidance array was then attached to the T2 spinous process and an AIRO intraoperative CT scan was obtained with images transferred to the [address] [place] [address] navigation unit. We confirmed that the system was accurate and proceeded with instrumentation. At T1 and T2, pedicle screws were placed using AIRO image guidance. A small  hole was drilled to allow cannulation of the pedicle with a navigable thoracic pedicle probe. Screws were placed after tapping and verification of proper trajectory using a ballpoint probe. All screws had good bony purchase.     At this time, the laminectomy was initiated with drilling of troughs on the lateral aspects of the laminae of C6 and C7 using the AM8 attachment on a Midas Kelechi drill. The C7 spinous process and lamina bone complex was then elevated up off the dura while the residual ligamentous attachments were released with Kerrison punch forceps. Additional bony edges and compressive ligamentum flavum were removed to complete the central decompression. Next, we proceeded with a left foraminotomy at C6-7. Using a [employer], the pedicle and foramen was palpated. The inferior articulating process and subsequently the superior articulating process at the joint were removed. Using a 2-0 Kerrison punch, the nerve root was fully dorsally uncovered and decompressed. After again confirming the position at C6-7 with neuronavigation, the disc was then approached from the lateral aspect of the thecal sac. The posterior longitudinal ligament was noted to be quite full adjacent to the nerve root. It was incised and a large disc fragment was immediately encountered. It was mobilized and removed along with several additional large disc fragments. Pituitary forceps were used to remove the mobilized fragments. Notably, there was a significant improvement in MEPs involving the left upper extremity following the decompression and discectomy. With the nerve root decompression and discectomy completed, attention was turned to completing the fixation of the subaxial cervical spine. C3-6 lateral mass screws were placed bilaterally. This was accomplished by drilling  holes in the center of the remaining portions of the lateral masses and then drilling a path angled 30 degrees lateral and 20 degrees rostral. After tapping the drilled trajectories, 14mm screws were placed at each level. Rods were then placed over the screw heads and bent to fit. Caps were placed over each screw to lock in the rods. A second AIRO intraoperative CT scan was then obtained to confirm the position of the instrumentation.  Finally, the facets and remaining posterior elements were decorticated from C3-T2 bilaterally, along with the exposed lateral aspects of the lateral masses. FiberGraft allograft was then placed in the posterolateral gutters, remaining laminae, and into the facets spanning C3-T2 to provide a substrate for fusion. Meticulous hemostasis was undertaken and the wound was irrigated copiously with antibiotic solution. Two medium hemovac drains were then laid deep to the fascia, tunneled away from the incision line, and secured to the skin with a stitch. The incision was then closed in layers using 0 Vicryl sutures in the deep muscles, fascia, and in the subcutaneous tissues. The skin was closed using a subcuticular running 2-0 Ethilon suture. Vancomycin powder (2g) was sprinkled in the wound suprafascially during the closure. A 1:1 mixture of Exparel and Marcaine was injected eloisa-incisionally in the paraspinous muscles. The wound was then dressed with antibiotic ointment, Telfa, and Medipore tape. All needle, sponge, and instrument counts were correct. The patient was turned back to supine position onto a hospital bed, taken out of [place] 3-pin fixation, and extubated without difficulty. He was taken to the PACU in stable condition. I affirm in accordance with CMS regulations that I was present and scrubbed for all critical portions of the case. ESTIMATED BLOOD LOSS: 300 mL    IMPLANTS:   1. Depuy Synthes Symphony posterior cervical screw system   C3: 3.5 x 14 mm screws bilaterally   C4: 3.5 x 14 mm screws bilaterally   C5: 3.5 x 14 mm screws bilaterally   C6: 3.5 x 14 mm screws bilaterally   T1: 4.5 x 32 mm screws bilaterally   T2: 4.5 x 32 mm screws bilaterally   2. 4.0 mm Ti rods bilaterally  3. FiberGraft Matrix strip    SPECIMEN:   1. None    DRAINS:   1. Medium Hemovac Drain x 2    DISPOSITION:  PACU in stable condition.

## 2021-03-18 NOTE — ANESTHESIA PRE PROCEDURE
Coags: No results found for: PROTIME, INR, APTT    HCG (If Applicable): No results found for: PREGTESTUR, PREGSERUM, HCG, HCGQUANT     ABGs: No results found for: PHART, PO2ART, ZID8MNQ, UNN8YYQ, BEART, H3GVDFPV     Type & Screen (If Applicable):  No results found for: LABABO, LABRH    Drug/Infectious Status (If Applicable):  No results found for: HIV, HEPCAB    COVID-19 Screening (If Applicable):   Lab Results   Component Value Date    COVID19 Not Detected 03/12/2021           Anesthesia Evaluation    Airway: Mallampati: II  TM distance: >3 FB   Neck ROM: limited  Mouth opening: > = 3 FB Dental:          Pulmonary:   (+) sleep apnea:      (-) asthma                           Cardiovascular:  Exercise tolerance: good (>4 METS),       (-) hypertension,  angina and  LUX                Neuro/Psych:      (-) seizures           GI/Hepatic/Renal:        (-) GERD       Endo/Other:        (-) diabetes mellitus               Abdominal:           Vascular:                                        Anesthesia Plan      general     ASA 3     (-npo MN  -denies chest pain or palp. Good ex angelica  -previous neck fusion, limited neck ROM)  Induction: intravenous. arterial line  MIPS: Postoperative opioids intended. Anesthetic plan and risks discussed with patient. Plan discussed with CRNA.                   Israel Carrion MD   3/18/2021

## 2021-03-18 NOTE — PROGRESS NOTES
Patient to PACU 12 s/p C3-4, C4-5, C5-6, C6-7, T1-T2 FUSION AND FIXATION, C6-C7 DECOMPRESSION, report received from CRNA, reported hemodynamically stable intra op with periods of hypertension, see emar. Patient alert, able to answer questions. Very drowsy at this  Time.  Denies pain

## 2021-03-18 NOTE — PROGRESS NOTES
PACU Transfer to Floor Note    Current Allergies: Simvastatin    Pt meets criteria as per Jayne Score and ASPAN Standards to transfer to next phase of care. No results for input(s): POCGLU in the last 72 hours. Vitals:    03/18/21 1830   BP: (!) 156/103   Pulse: 93   Resp: 8   Temp: 97.4 °F (36.3 °C)   SpO2: 93%     Vitals within 20% of pt's admission vitals as per JAYNE SCORE    SpO2: 93 %    O2 Flow Rate (L/min): 3 L/min      Intake/Output Summary (Last 24 hours) at 3/18/2021 1836  Last data filed at 3/18/2021 1708  Gross per 24 hour   Intake 1800 ml   Output 500 ml   Net 1300 ml       Pain assessment:  level of pain (1-10, 10 severe),     Pain Level: 7(patient falling asleep mid sentence)      Handoff report given at bedside.    Family updated and directed to pt room      3/18/2021 6:36 PM

## 2021-03-19 ENCOUNTER — APPOINTMENT (OUTPATIENT)
Dept: GENERAL RADIOLOGY | Age: 56
DRG: 460 | End: 2021-03-19
Attending: NEUROLOGICAL SURGERY
Payer: COMMERCIAL

## 2021-03-19 PROBLEM — Z98.1 S/P CERVICAL SPINAL FUSION: Status: ACTIVE | Noted: 2021-03-19

## 2021-03-19 LAB
ANION GAP SERPL CALCULATED.3IONS-SCNC: 12 MMOL/L (ref 3–16)
BUN BLDV-MCNC: 11 MG/DL (ref 7–20)
CALCIUM SERPL-MCNC: 8.8 MG/DL (ref 8.3–10.6)
CHLORIDE BLD-SCNC: 99 MMOL/L (ref 99–110)
CO2: 23 MMOL/L (ref 21–32)
CREAT SERPL-MCNC: 0.7 MG/DL (ref 0.9–1.3)
GFR AFRICAN AMERICAN: >60
GFR NON-AFRICAN AMERICAN: >60
GLUCOSE BLD-MCNC: 198 MG/DL (ref 70–99)
HCT VFR BLD CALC: 38 % (ref 40.5–52.5)
HEMOGLOBIN: 13 G/DL (ref 13.5–17.5)
MCH RBC QN AUTO: 31.7 PG (ref 26–34)
MCHC RBC AUTO-ENTMCNC: 34.2 G/DL (ref 31–36)
MCV RBC AUTO: 92.6 FL (ref 80–100)
PDW BLD-RTO: 13.3 % (ref 12.4–15.4)
PLATELET # BLD: 305 K/UL (ref 135–450)
PMV BLD AUTO: 8.1 FL (ref 5–10.5)
POTASSIUM SERPL-SCNC: 4.2 MMOL/L (ref 3.5–5.1)
RBC # BLD: 4.1 M/UL (ref 4.2–5.9)
SODIUM BLD-SCNC: 134 MMOL/L (ref 136–145)
WBC # BLD: 14 K/UL (ref 4–11)

## 2021-03-19 PROCEDURE — 72040 X-RAY EXAM NECK SPINE 2-3 VW: CPT

## 2021-03-19 PROCEDURE — 97161 PT EVAL LOW COMPLEX 20 MIN: CPT

## 2021-03-19 PROCEDURE — 97165 OT EVAL LOW COMPLEX 30 MIN: CPT

## 2021-03-19 PROCEDURE — 1200000000 HC SEMI PRIVATE

## 2021-03-19 PROCEDURE — 80048 BASIC METABOLIC PNL TOTAL CA: CPT

## 2021-03-19 PROCEDURE — 6360000002 HC RX W HCPCS: Performed by: NEUROLOGICAL SURGERY

## 2021-03-19 PROCEDURE — 97530 THERAPEUTIC ACTIVITIES: CPT

## 2021-03-19 PROCEDURE — 97535 SELF CARE MNGMENT TRAINING: CPT

## 2021-03-19 PROCEDURE — 85027 COMPLETE CBC AUTOMATED: CPT

## 2021-03-19 PROCEDURE — 2580000003 HC RX 258: Performed by: NEUROLOGICAL SURGERY

## 2021-03-19 PROCEDURE — 36415 COLL VENOUS BLD VENIPUNCTURE: CPT

## 2021-03-19 PROCEDURE — 6370000000 HC RX 637 (ALT 250 FOR IP): Performed by: NURSE PRACTITIONER

## 2021-03-19 PROCEDURE — 6370000000 HC RX 637 (ALT 250 FOR IP): Performed by: INTERNAL MEDICINE

## 2021-03-19 PROCEDURE — 6370000000 HC RX 637 (ALT 250 FOR IP): Performed by: NEUROLOGICAL SURGERY

## 2021-03-19 PROCEDURE — 97116 GAIT TRAINING THERAPY: CPT

## 2021-03-19 RX ORDER — METHOCARBAMOL 750 MG/1
750 TABLET, FILM COATED ORAL EVERY 6 HOURS SCHEDULED
Status: DISCONTINUED | OUTPATIENT
Start: 2021-03-19 | End: 2021-03-21 | Stop reason: HOSPADM

## 2021-03-19 RX ADMIN — CEFAZOLIN 3000 MG: 10 INJECTION, POWDER, FOR SOLUTION INTRAVENOUS at 05:23

## 2021-03-19 RX ADMIN — HEPARIN SODIUM 5000 UNITS: 5000 INJECTION INTRAVENOUS; SUBCUTANEOUS at 21:04

## 2021-03-19 RX ADMIN — PANTOPRAZOLE SODIUM 40 MG: 40 TABLET, DELAYED RELEASE ORAL at 05:23

## 2021-03-19 RX ADMIN — HYDROMORPHONE HYDROCHLORIDE 1 MG: 1 INJECTION, SOLUTION INTRAMUSCULAR; INTRAVENOUS; SUBCUTANEOUS at 15:53

## 2021-03-19 RX ADMIN — OXYCODONE 10 MG: 5 TABLET ORAL at 05:23

## 2021-03-19 RX ADMIN — CITALOPRAM HYDROBROMIDE 40 MG: 40 TABLET ORAL at 08:25

## 2021-03-19 RX ADMIN — METHOCARBAMOL 750 MG: 750 TABLET ORAL at 17:59

## 2021-03-19 RX ADMIN — METHOCARBAMOL 1000 MG: 100 INJECTION, SOLUTION INTRAMUSCULAR; INTRAVENOUS at 09:34

## 2021-03-19 RX ADMIN — PANTOPRAZOLE SODIUM 40 MG: 40 TABLET, DELAYED RELEASE ORAL at 17:00

## 2021-03-19 RX ADMIN — HYDROMORPHONE HYDROCHLORIDE 1 MG: 1 INJECTION, SOLUTION INTRAMUSCULAR; INTRAVENOUS; SUBCUTANEOUS at 06:38

## 2021-03-19 RX ADMIN — HYDROMORPHONE HYDROCHLORIDE 1 MG: 1 INJECTION, SOLUTION INTRAMUSCULAR; INTRAVENOUS; SUBCUTANEOUS at 11:25

## 2021-03-19 RX ADMIN — DIAZEPAM 5 MG: 5 TABLET ORAL at 08:26

## 2021-03-19 RX ADMIN — Medication 10 ML: at 08:25

## 2021-03-19 RX ADMIN — DOCUSATE SODIUM 50 MG AND SENNOSIDES 8.6 MG 1 TABLET: 8.6; 5 TABLET, FILM COATED ORAL at 21:04

## 2021-03-19 RX ADMIN — FLUTICASONE PROPIONATE 1 SPRAY: 50 SPRAY, METERED NASAL at 09:34

## 2021-03-19 RX ADMIN — ACETAMINOPHEN 650 MG: 325 TABLET ORAL at 08:26

## 2021-03-19 RX ADMIN — ACETAMINOPHEN 650 MG: 325 TABLET ORAL at 17:00

## 2021-03-19 RX ADMIN — HYDROMORPHONE HYDROCHLORIDE 1 MG: 1 INJECTION, SOLUTION INTRAMUSCULAR; INTRAVENOUS; SUBCUTANEOUS at 00:26

## 2021-03-19 RX ADMIN — POLYETHYLENE GLYCOL (3350) 17 G: 17 POWDER, FOR SOLUTION ORAL at 08:25

## 2021-03-19 RX ADMIN — OXYCODONE 10 MG: 5 TABLET ORAL at 13:30

## 2021-03-19 RX ADMIN — METHOCARBAMOL 750 MG: 750 TABLET ORAL at 11:25

## 2021-03-19 RX ADMIN — CEFAZOLIN 3000 MG: 10 INJECTION, POWDER, FOR SOLUTION INTRAVENOUS at 23:39

## 2021-03-19 RX ADMIN — HYDROMORPHONE HYDROCHLORIDE 1 MG: 1 INJECTION, SOLUTION INTRAMUSCULAR; INTRAVENOUS; SUBCUTANEOUS at 21:04

## 2021-03-19 RX ADMIN — GABAPENTIN 300 MG: 300 CAPSULE ORAL at 21:04

## 2021-03-19 RX ADMIN — OXYCODONE 10 MG: 5 TABLET ORAL at 17:59

## 2021-03-19 RX ADMIN — GABAPENTIN 300 MG: 300 CAPSULE ORAL at 13:30

## 2021-03-19 RX ADMIN — GABAPENTIN 300 MG: 300 CAPSULE ORAL at 08:25

## 2021-03-19 RX ADMIN — DOCUSATE SODIUM 50 MG AND SENNOSIDES 8.6 MG 1 TABLET: 8.6; 5 TABLET, FILM COATED ORAL at 08:25

## 2021-03-19 RX ADMIN — Medication 10 ML: at 21:05

## 2021-03-19 RX ADMIN — CEFAZOLIN 3000 MG: 10 INJECTION, POWDER, FOR SOLUTION INTRAVENOUS at 00:26

## 2021-03-19 RX ADMIN — OXYCODONE 10 MG: 5 TABLET ORAL at 09:34

## 2021-03-19 RX ADMIN — CEFAZOLIN 3000 MG: 10 INJECTION, POWDER, FOR SOLUTION INTRAVENOUS at 15:34

## 2021-03-19 RX ADMIN — DIAZEPAM 5 MG: 5 TABLET ORAL at 15:53

## 2021-03-19 RX ADMIN — DIAZEPAM 5 MG: 5 TABLET ORAL at 02:08

## 2021-03-19 ASSESSMENT — PAIN DESCRIPTION - DESCRIPTORS
DESCRIPTORS: ACHING

## 2021-03-19 ASSESSMENT — PAIN SCALES - GENERAL
PAINLEVEL_OUTOF10: 7
PAINLEVEL_OUTOF10: 10
PAINLEVEL_OUTOF10: 8
PAINLEVEL_OUTOF10: 7
PAINLEVEL_OUTOF10: 9
PAINLEVEL_OUTOF10: 4
PAINLEVEL_OUTOF10: 8
PAINLEVEL_OUTOF10: 7
PAINLEVEL_OUTOF10: 7
PAINLEVEL_OUTOF10: 0
PAINLEVEL_OUTOF10: 10

## 2021-03-19 ASSESSMENT — PAIN DESCRIPTION - FREQUENCY
FREQUENCY: CONTINUOUS

## 2021-03-19 ASSESSMENT — PAIN DESCRIPTION - PAIN TYPE
TYPE: ACUTE PAIN
TYPE: ACUTE PAIN;SURGICAL PAIN

## 2021-03-19 ASSESSMENT — PAIN - FUNCTIONAL ASSESSMENT
PAIN_FUNCTIONAL_ASSESSMENT: ACTIVITIES ARE NOT PREVENTED

## 2021-03-19 ASSESSMENT — PAIN DESCRIPTION - PROGRESSION
CLINICAL_PROGRESSION: NOT CHANGED
CLINICAL_PROGRESSION: GRADUALLY IMPROVING
CLINICAL_PROGRESSION: GRADUALLY IMPROVING
CLINICAL_PROGRESSION: GRADUALLY WORSENING
CLINICAL_PROGRESSION: NOT CHANGED
CLINICAL_PROGRESSION: GRADUALLY WORSENING
CLINICAL_PROGRESSION: GRADUALLY WORSENING
CLINICAL_PROGRESSION: NOT CHANGED
CLINICAL_PROGRESSION: NOT CHANGED

## 2021-03-19 ASSESSMENT — PAIN DESCRIPTION - ONSET
ONSET: ON-GOING

## 2021-03-19 ASSESSMENT — PAIN DESCRIPTION - ORIENTATION
ORIENTATION: MID;POSTERIOR
ORIENTATION: MID
ORIENTATION: MID;POSTERIOR

## 2021-03-19 ASSESSMENT — PAIN DESCRIPTION - LOCATION
LOCATION: NECK

## 2021-03-19 NOTE — DISCHARGE INSTR - COC
Continuity of Care Form    Patient Name: Delfino Cote   :  1965  MRN:  [id number]    Admit date:  3/18/2021  Discharge date:  ***    Code Status Order: Full Code   Advance Directives:   Advance Care Flowsheet Documentation     Date/Time Healthcare Directive Type of Healthcare Directive Copy in [address] Agent's Name Healthcare Agent's Phone Number    21 1025  Yes, patient has an advance directive for healthcare treatment  --  No, copy requested from family -- -- --    03/15/21 1010  Yes, patient has an advance directive for healthcare treatment  Living will;Health care treatment directive  No, copy requested from family -- -- --          Admitting Physician:   Miguel Feliz MD  PCP: Chantal Fuentes PA-C    Discharging Nurse: [facility] Unit/Room#: 5057/5554-00  Discharging Unit Phone Number: ***    Emergency Contact:   Extended Emergency Contact Information  Primary Emergency Contact: Lisette Hester  Address:  [address] Phone: [phone]  Mobile Phone: [phone]  Relation: Spouse    Past Surgical History:  Past Surgical History:   Procedure Laterality Date    BACK SURGERY  2018    fusion  c5-6    CERVICAL FUSION N/A 3/18/2021    C3-4, C4-5, C5-6, C6-7, T1-T2 FUSION AND FIXATION, C6-C7 DECOMPRESSION performed by Miguel Feliz MD at [address] [place] [address]         Immunization History:   Immunization History   Administered Date(s) Administered    Influenza Virus Vaccine 2013, 10/01/2018    Td, unspecified formulation 07/15/2005    Tdap (Boostrix, Adacel) 2008, 10/01/2018       Active Problems:  Patient Active Problem List   Diagnosis Code    Cervical spondylosis with radiculopathy M47.22       Isolation/Infection:   Isolation          No Isolation        Patient Infection Status     None to display          Nurse Assessment:  Last Vital Signs: BP (!) 157/95 Pulse 90   Temp 98.5 °F (36.9 °C) (Oral)   Resp 16   Ht 5' 11\" (1.803 m)   Wt 290 lb (131.5 kg)   SpO2 92%   BMI 40.45 kg/m²     Last documented pain score (0-10 scale): Pain Level: 8  Last Weight:   Wt Readings from Last 1 Encounters:   03/18/21 290 lb (131.5 kg)     Mental Status:  {IP PT MENTAL STATUS:43210}    IV Access:  { ANTHONY IV ACCESS:910475271}    Nursing Mobility/ADLs:  Walking   {CHP DME ATUW:540694691}  Transfer  {CHP DME ALEXY:331947276}  Bathing  {CHP DME MRET:227294624}  Dressing  {CHP DME OQVJ:784237695}  Toileting  {CHP DME ADLS:303079632}  Feeding  {CHP DME LXIC:655559421}  Med Admin  {P DME ERPS:106890616}  Med Delivery   { ANTHONY MED Delivery:183171343}    Wound Care Documentation and Therapy:        Elimination:  Continence:   · Bowel: {YES / XD:38577}  · Bladder: {YES / VE:86978}  Urinary Catheter: {Urinary Catheter:492703794}   Colostomy/Ileostomy/Ileal Conduit: {YES / VJ:41206}       Date of Last BM: ***    Intake/Output Summary (Last 24 hours) at 3/19/2021 1036  Last data filed at 3/19/2021 0830  Gross per 24 hour   Intake 2585 ml   Output 1235 ml   Net 1350 ml     I/O last 3 completed shifts:   In: 2335 [P.O.:360; I.V.:1975]  Out: 80 [Urine:800; Drains:135; Blood:300]    Safety Concerns:     508 PA Semi Safety Concerns:620278247}    Impairments/Disabilities:      508 PA Semi Impairments/Disabilities:384132556}    Nutrition Therapy:  Current Nutrition Therapy:   508 PA Semi Diet List:674110797}    Routes of Feeding: {CHP DME Other Feedings:603839747}  Liquids: {Slp liquid thickness:06143}  Daily Fluid Restriction: {CHP DME Yes amt example:831298985}  Last Modified Barium Swallow with Video (Video Swallowing Test): {Done Not Done [facility]:020250758}    Treatments at the Time of Hospital Discharge:   Respiratory Treatments: ***  Oxygen Therapy:  {Therapy; copd oxygen:93219}  Ventilator:    {MH CC Vent AWYL:245464401}    Rehab Therapies: {THERAPEUTIC INTERVENTION:0042927429}  Weight Bearing Status/Restrictions: [address] L[address] Mosher CC Weight Bearin}  Other Medical Equipment (for information only, NOT a DME order):  {EQUIPMENT:149534210}  Other Treatments: ***    Patient's personal belongings (please select all that are sent with patient):  {CHP DME Belongings:333169870}    RN SIGNATURE:  {Esignature:810299377}    CASE MANAGEMENT/SOCIAL WORK SECTION    Inpatient Status Date: 3/18/2021    Readmission Risk Assessment Score:  Readmission Risk              Risk of Unplanned Readmission:        8           / signature: Electronically signed by Tatiana Celis RN on 3/19/21 at 10:36 AM EDT    PHYSICIAN SECTION    Prognosis: {Prognosis:7235117119}    Condition at Discharge: [address] Mosher Patient Condition:879331588}    Rehab Potential (if transferring to Rehab): {Prognosis:0978613714}    Recommended Labs or Other Treatments After Discharge: ***    Physician Certification: I certify the above information and transfer of Cleatis Purchase  is necessary for the continuing treatment of the diagnosis listed and that he requires {Admit to Appropriate Level of Care:42147} for {GREATER/LESS:641448745} 30 days.      Update Admission H&P: {CHP DME Changes in CDPDR:171245089}    PHYSICIAN SIGNATURE:  {Esignature:245058842}

## 2021-03-19 NOTE — PROGRESS NOTES
Physical Therapy    Facility/Department: [facility] 5T ORTHO/NEURO  Initial Assessment and Treatment/Discharge    NAME: Bertin Inman  : 1965  MRN: [id number]    Date of Service: 3/19/2021    Discharge Recommendations:    Bertin Inman scored a 21/24 on the AM-PAC short mobility form. At this time, no further PT is recommended upon discharge due to pt SB/Supervision. Recommend patient returns to prior setting with prior services. PT Equipment Recommendations  Equipment Needed: No    Assessment   Assessment: Pt with slightly decreased independent mobility s/p  C3-4, C4-5, C5-6, C6-7, T1-T2 FUSION AND FIXATION, C6-C7 DECOMPRESSION. Demonstrated safe mobility with SB/supervision. Should progress well with progressive activity. Encouraged pt to be OOB and ambulating with staff as tolerated. Plans to return home with wife - has no concerns about managing. No further acute PT needs identified. Decision Making: Low Complexity  PT Education: Goals;PT Role;Plan of Care;General Safety; Functional Mobility Training;Precautions  Patient Education: Pt verbalized understanding  REQUIRES PT FOLLOW UP: No       Patient Diagnosis(es): There were no encounter diagnoses. has a past medical history of Hyperlipidemia and Sleep apnea. has a past surgical history that includes back surgery (2018); hernia repair; and cervical fusion (N/A, 3/18/2021). Restrictions  Position Activity Restriction  Other position/activity restrictions: activity as tolerated, ambulate  Vision/Hearing        Subjective  General  Chart Reviewed: Yes  Additional Pertinent Hx: Pt is a 48 y.o. male adm 3/18 s/p C3-4, C4-5, C5-6, C6-7, T1-T2 FUSION AND FIXATION, C6-C7 DECOMPRESSION. PMH: hyperlipidemia, sleep apnea, previous neck surgery 2018  Referring Practitioner: Mina Henry MD  Referral Date : 21  Subjective  Subjective: Pt found reclined in chair.   Agreeable to PT  Pain Screening  Patient Currently in Pain: Yes(-8/10 incisional, RN aware)  Vital Signs  Patient Currently in Pain: Yes(7-8/10 incisional, RN aware)       Orientation  Orientation  Overall Orientation Status: Within Functional Limits  Social/Functional History  Social/Functional History  Lives With: Spouse(son (23y.o) and father in law)  Type of Home: House  Home Layout: One level  Home Access: Stairs to enter with rails  Entrance Stairs - Number of Steps: 2-3  Bathroom Shower/Tub: Walk-in shower, Tub/Shower unit(typically uses tub/shower but able to temporarily use walk-in w/ shower chair)  Bathroom Toilet: Handicap height(counter close for leverage)  Bathroom Equipment: Grab bars in shower, Shower chair  Home Equipment: Cane, Reacher  ADL Assistance: Independent  Homemaking Assistance: Independent(shares w/ wife)  Ambulation Assistance: Independent(without AD)  Transfer Assistance: Independent  Active : Yes  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 Will have assist at d/c - wife can work from home. Cognition        Objective          AROM RLE (degrees)  RLE AROM: WFL  AROM LLE (degrees)  LLE AROM : WFL  Strength RLE  Strength RLE: WFL  Strength LLE  Strength LLE: WFL           Transfers  Sit to Stand: Stand by assistance  Stand to sit: Supervision  Ambulation  Ambulation?: Yes  Ambulation 1  Device: No Device  Assistance: Supervision  Quality of Gait: mildly guarded, steady without LOB  Distance: 300'  Stairs/Curb  Stairs?: Yes(Up 2 6 inch steps and down 3 4 inch steps with 1 rail, step to pattern CG/SBA. )            Treatment included: transfers, gt, pt education    Plan   Plan  Times per week: D/C PT  Safety Devices  Type of devices: Call light within reach, Chair alarm in place, Nurse notified, Left in chair    G-Code       OutComes Score AM-PAC Score  AM-PAC Inpatient Mobility Raw Score : 21 (03/19/21 1018)  AM-PAC Inpatient T-Scale Score : 50.25 (03/19/21 1018)  Mobility Inpatient CMS 0-100% Score: 28.97 (03/19/21 1018)  Mobility Inpatient CMS G-Code Modifier : CJ (03/19/21 1018)          Goals  Short term goals  Time Frame for Short term goals: No goals set - pt SB/supervision       Therapy Time   Individual Concurrent Group Co-treatment   Time In 0954         Time Out 1017         Minutes 23               Timed Code Treatment Minutes: 8      Total Treatment Minutes:  [address], PT

## 2021-03-19 NOTE — PROGRESS NOTES
Occupational Therapy   Occupational Therapy Initial Assessment/Tx and Discharge  Date: 3/19/2021   Patient Name: Kerline Johns  MRN: [id number]     : 1965    Date of Service: 3/19/2021    Discharge Recommendations:  Kerline Johns scored a 21/24 on the AM-PAC ADL Inpatient form. At this time, no further OT is recommended upon discharge. Recommend patient returns to prior setting with prior services. OT Equipment Recommendations  Equipment Needed: No    Assessment   Assessment: Pt evaluated POD#1 following cervical sx. Pt limited by pain, but demo safe mobility and ADLs with sba-supervision. Pt has DME for initial modified ADLs following sx. Pt has no skilled OT needs, will sign off. Pt's wife plans to provide initial 24hr A  Decision Making: Low Complexity  REQUIRES OT FOLLOW UP: No  Activity Tolerance  Activity Tolerance: Patient Tolerated treatment well  Safety Devices  Safety Devices in place: Yes  Type of devices: Nurse notified; Left in chair;Chair alarm in place;Call light within reach           Restrictions  Position Activity Restriction  Other position/activity restrictions: activity as tolerated, ambulate    Subjective   General  Chart Reviewed: Yes  Additional Pertinent Hx: Pt with displaced intervertebral disc C6-7, L C7 radiculapathy, admitted 3/18 s/p C3-4, C4-5, C5-6, C6-7, T1-T2 FUSION AND FIXATION, C6-C7 DECOMPRESSION per Dr. Kaleb Savage. PMHx: sleep apnea, cervical sx ,  hernia repair  Referring Practitioner: Dr. Kaleb Savage  Diagnosis: displaced intervertebral disc C6-7  Subjective  Subjective: Pt seated in chair, agreeable to therapy.  Reports pre-op pain and numbness in L UE is improved  Patient Currently in Pain: Yes (8/10 neck surgical site, ice pack in place, RN medicated beginning of session)    Social/Functional History  Social/Functional History  Lives With: Spouse(son (23y.o) and father in law)  Type of Home: House  Home Layout: One level  Home Access: Stairs to enter with rails  Entrance Stairs - Number of Steps: 2-3  Bathroom Shower/Tub: Walk-in shower, Tub/Shower unit(typically uses tub/shower but able to temporarily use walk-in w/ shower chair)  Bathroom Toilet: Handicap height(counter close for leverage)  Bathroom Equipment: Grab bars in shower, Shower chair  Home Equipment: [employer] beach, Reacher  ADL Assistance: Independent  Homemaking Assistance: Independent(shares w/ wife)  Ambulation Assistance: Independent(without AD)  Transfer Assistance: Independent  Active : Yes  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 Will have assist at d/c - wife can work from home. Objective   Vision: Within Functional Limits  Hearing: Within functional limits      Orientation  Overall Orientation Status: Within Normal Limits        Balance  Sitting Balance: Independent  Standing Balance: Supervision(static stance during ADLs)      Functional Mobility  Functional - Mobility Device: No device  Activity: To/from bathroom; Other(hallway 400')  Assist Level: Stand by assistance(progressing to supervision)  Functional Mobility Comments: slow pace 2/2 pain, guarded posture and shoulders- encouraged pt to relax shoulders to help aleviate neck/shoulder pain. Pt steady with no LOB observed    Toilet Transfers  Toilet - Technique: Ambulating  Equipment Used: Standard toilet  Toilet Transfer: Supervision    ADL  Feeding: Independent  Grooming: Independent  LE Dressing: Stand by assistance(don underwear and pants, increased time)  Toileting: Stand by assistance(urinate in stance, clothing mgmt)   Education: Pt educated on DME recommendations and modified ADLs for safety with limited neck ROM and for increased safety/independence with ADLs.  Educated on safe shower transfer technique with initial family assist vs initial sponge

## 2021-03-19 NOTE — PLAN OF CARE
Problem: Pain:  Goal: Pain level will decrease  Description: Pain level will decrease  Outcome: Ongoing   Numeric pain rating scale being used. Patient repositioned for comfort. Ice applied. Patient is tolerating PO pain medicine. Will continue to assess. Problem: Falls - Risk of:  Goal: Will remain free from falls  Description: Will remain free from falls  Outcome: Ongoing   Patient has remained free of falls. 2/4 bed rails up, bed locked and in lowest position, call light within reach. Patient instructed on use of call light and uses appropriately. Bed alarm on. Non-skid footwear and fall band on. Will continue to monitor.

## 2021-03-19 NOTE — PLAN OF CARE
Problem: Pain:  Goal: Pain level will decrease  Description: Pain level will decrease  3/19/2021 1103 by Saray Paredes RN  Outcome: Ongoing  Note: Patient endorsing surgical site pain to posterior neck. Rating pain an 8/10. PRN valium, tylenol, dilaudid, and oxy available for pain control and administered per the [facility]. Patient educated on medications, side effects, and verbalized understanding. Ice pack applied to posterior neck. Assisted with repositioning in bed/chair for comfort. Notified when next dose of medications can be administered. Upon reassessment, patient still endorsing pain to posterior neck. Will medicate per [facility] and continue to monitor/reassess. Problem: Falls - Risk of:  Goal: Will remain free from falls  Description: Will remain free from falls  3/19/2021 1103 by Saray Paredes RN  Outcome: Ongoing  Note: Patient will remain free from falls throughout shift. Ambulating x1 SBA with gait belt and walker. Fall precautions in place. Non-skid socks on. Bed locked in lowest position with alarm on and 2/4 side rails up. Bedside table, belongings, and call light placed within reach. Patient calling out appropriately when needing assistance. Hourly rounding in anticipation of patient needs. Floor clean and free from clutter. Room door open. Will continue to monitor.

## 2021-03-19 NOTE — CARE COORDINATION
Case Management Assessment           Initial Evaluation                Date / Time of Evaluation: 3/19/2021 10:28 AM                 Assessment Completed by: Kristine Patel    Patient Name: Jaime Vogt     YOB: 1965  Diagnosis: Displacement of intervertebral disc at C6-C7 level [M50.223]  Cervical spondylosis with radiculopathy [M47.22]     Date / Time: 3/18/2021  9:48 AM    Patient Admission Status: Inpatient    If patient is discharged prior to next notation, then this note serves as note for discharge by case management. Current PCP: Samanta Monroe PA-C  Clinic Patient: No    Chart Reviewed: Yes  Patient/ Family Interviewed: Yes    Initial assessment completed at bedside with: kadie    Hospitalization in the last 30 days: No    Emergency Contacts:  Extended Emergency Contact Information  Primary Emergency Contact: Lisette Hester  Address: [address] [place] [address] Phone: [phone]  Mobile Phone: [phone]  Relation: Spouse    Advance Directives:   Code Status: [address]: No  Agent:   Contact Number:     Copy present: No     In paper Chart: No    Scanned into EMR No    Financial  Payor: [facility] [place] / Plan: [address] / Product Type: *No Product type* /     Pre-cert required for SNF: Yes    [address] [address]  Phone: [phone] Fax: [phone]    [address], [address] Madie Merlin [address] [place] [address]  Phone: [phone] Fax: [phone]      Potential assistance Purchasing Medications: Potential Assistance Purchasing Medications: No  Does Patient want to participate in local refill/ meds to beds program?: No    Meds To Beds General Rules:  1.  Can ONLY be done Monday- Friday between 8:30am-5pm  2. Prescription(s) must be in pharmacy by 3pm to be filled same day  3. Copy of patient's insurance/ prescription drug card and patient face sheet must be sent along with the prescription(s)  4. Cost of Rx cannot be added to hospital bill. If financial assistance is needed, please contact unit  or ;  or  CANNOT provide pharmacy voucher for patients co-pays  5.  Patients can then  the prescription on their way out of the hospital at discharge, or pharmacy can deliver to the bedside if staff is available. (payment due at time of pick-up or delivery - cash, check, or card accepted)     Able to afford home medications/ co-pay costs: Yes    ADLS  Support Systems: Spouse/Significant Other, Children, Family Members    PT AM-PAC: 21 /24  OT AM-PAC:   /24    [place]: home with wife  Steps: 2    Plans to RETURN to current housing: Yes  Barriers to RETURNING to current housing: safety, pain control    [address]  Currently ACTIVE with [address]: No  Home Care Agency: Not Applicable    Currently ACTIVE with [place] on Aging: No  Passport/ Waiver: No  Passport/ Waiver Services: Not Applicable    :  Phone:       [address]  [facility] [place]:   Equipment:     Home Oxygen and [address] prior to admission: No  [address]: Not Applicable  Other Respiratory Equipment:     Informed of need to bring portable home O2 tank on day of DISCHARGE for nursing to connect prior to leaving: No  Verbalized agreement/Understanding: No  Person to bring portable tank at discharge:     Dialysis  Active with HD/PD prior to admission: No  Nephrologist:     HD Center:  Not Applicable    DISCHARGE PLAN:  Disposition: Home- No Services Needed    Transportation PLAN for discharge: family     Factors facilitating achievement of predicted outcomes: Family support    Barriers to

## 2021-03-19 NOTE — PROGRESS NOTES
Patient alert and oriented x4. VSS. Surgical site is CDI. Neuro checks WNL. Pain controlled with PO and IV pain medicine. Tolerating ambulation well. Urinating adequately. Continued output from hemovacs. Fall precautions in place.

## 2021-03-19 NOTE — PROGRESS NOTES
NEUROSURGERY POST-OP PROGRESS NOTE    Patient Name: Vic Akers YOB: 1965   Sex:  Male Age: 48 yrs     Medical Record Number: [id number] Acct Number: [de-identified]   Room Number: 4541/2663-73 Hospital Day: Hospital Day: 2     Interval History:  Post-operative Day# 1 s/p Procedure(s) (LRB):  C3-4, C4-5, C5-6, C6-7, T1-T2 FUSION AND FIXATION, C6-C7 DECOMPRESSION (N/A)    Subjective: Patient up in chair c/o of severe muscle spasms, Left arm pain gone     Objective:    VITAL SIGNS   BP (!) 157/95   Pulse 90   Temp 98.5 °F (36.9 °C) (Oral)   Resp 16   Ht 5' 11\" (1.803 m)   Wt 290 lb (131.5 kg)   SpO2 92%   BMI 40.45 kg/m²    Height Height: 5' 11\" (180.3 cm)   Weight Weight: 290 lb (131.5 kg)        Allergies Allergies   Allergen Reactions    Simvastatin      Muscle aches      NPO Status DIET GENERAL;   Isolation No active isolations     LABS   Basic Metabolic Profile Recent Labs     03/19/21  0503   *   CL 99   CO2 23   BUN 11   CREATININE 0.7*   GLUCOSE 198*      Complete Blood Count Recent Labs     03/19/21  0503   WBC 14.0*   RBC 4.10*      Coagulation Studies Recent Labs     03/18/21  1045   INR 1.09        MEDICATIONS   Inpatient Medications     gabapentin, 300 mg, Oral, TID    fluticasone, 1 spray, Each Nostril, Daily    pantoprazole, 40 mg, Oral, BID AC    sodium chloride flush, 10 mL, Intravenous, 2 times per day    ceFAZolin (ANCEF) IVPB, 3,000 mg, Intravenous, Q8H    methocarbamol IVPB, 1,000 mg, Intravenous, Q8H **FOLLOWED BY** methocarbamol, 750 mg, Oral, Q6H PRN    sennosides-docusate sodium, 1 tablet, Oral, BID    polyethylene glycol, 17 g, Oral, Daily    heparin (porcine), 5,000 Units, Subcutaneous, Q8H    citalopram, 40 mg, Oral, Daily   Infusions    sodium chloride 75 mL/hr at 03/18/21 1943      Antibiotics   Recent Abx Admin                   ceFAZolin (ANCEF) 3,000 mg in dextrose 5 % 100 mL IVPB (mg) 3,000 mg New Bag 03/19/21 0523     3,000 mg New Bag  0026 vancomycin (VANCOCIN) injection (g) 1 g Given 03/18/21 1549    ceFAZolin (ANCEF) 1,000 mg in sodium chloride 0.9 % 1,000 mL (mL) 1,000 mL Given 03/18/21 1456    ceFAZolin (ANCEF) 2000 mg in dextrose 3 % 50 mL IVPB (duplex) (g) 3 g Given 03/18/21 1405                 Neurologic Exam:  Mental status: awake and alert and oriented x4    Musculoskeletal:   Gait: Not tested   Tone: normal  Sensory: intact to all extremities  Motor strength:    Right  Left    Right  Left    Deltoid  5 5  Hip Flex  5 5   Biceps  5 4  Knee Extensors  5 5   Triceps  5 5  Knee Flexors  5 5   Wrist Ext  5 5  Ankle Dorsiflex. 5 5   Wrist Flex  5 5  Ankle Plantarflex. 5 5   Handgrip  5 5  Ext Akin Longus  5 5   Thumb Ext  5 5         Incision: intact, clean and dry  Drain:L:115  R:20    Respiratory:  Unlabored respiratory pattern    Abdomen:   Soft, ND       Cardiovascular:  Warm, well perfused    Assessment   Patient is a 47 yo M s/p Procedure(s) (LRB):  C3-4, C4-5, C5-6, C6-7, T1-T2 FUSION AND FIXATION, C6-C7 DECOMPRESSION (N/A) per   Plan:  1. Neurologic exam frequency:q4  2. Mobility:PT/OT eval  3. Drain:continue  4. DVT Prophylaxis: SCDs   5. GI Prophylaxis:scd's and heparin sq  6. Bowel Regimen: glycolax and senna  7. Pain control:roxiand robaxin and valium  8. Incisional Care:open to air  9. Dispo Planning:Discarge once drain slows down, and pain under control prob Sunday    Patient was seen with Dr. Pedro Killian who agrees with above assessment and plan. Electronically signed by:  Sergei Richard, 3/19/2021 9:17 AM   Neurosurgery Nurse Practitioner  [phone]

## 2021-03-20 LAB
ANION GAP SERPL CALCULATED.3IONS-SCNC: 9 MMOL/L (ref 3–16)
BUN BLDV-MCNC: 11 MG/DL (ref 7–20)
CALCIUM SERPL-MCNC: 8.4 MG/DL (ref 8.3–10.6)
CHLORIDE BLD-SCNC: 99 MMOL/L (ref 99–110)
CO2: 25 MMOL/L (ref 21–32)
CREAT SERPL-MCNC: 0.7 MG/DL (ref 0.9–1.3)
GFR AFRICAN AMERICAN: >60
GFR NON-AFRICAN AMERICAN: >60
GLUCOSE BLD-MCNC: 164 MG/DL (ref 70–99)
GLUCOSE BLD-MCNC: 173 MG/DL (ref 70–99)
GLUCOSE BLD-MCNC: 179 MG/DL (ref 70–99)
GLUCOSE BLD-MCNC: 205 MG/DL (ref 70–99)
HCT VFR BLD CALC: 35.9 % (ref 40.5–52.5)
HEMOGLOBIN: 12.6 G/DL (ref 13.5–17.5)
MCH RBC QN AUTO: 31.9 PG (ref 26–34)
MCHC RBC AUTO-ENTMCNC: 35.2 G/DL (ref 31–36)
MCV RBC AUTO: 90.8 FL (ref 80–100)
PDW BLD-RTO: 13.5 % (ref 12.4–15.4)
PERFORMED ON: ABNORMAL
PLATELET # BLD: 247 K/UL (ref 135–450)
PMV BLD AUTO: 7.7 FL (ref 5–10.5)
POTASSIUM REFLEX MAGNESIUM: 3.9 MMOL/L (ref 3.5–5.1)
RBC # BLD: 3.95 M/UL (ref 4.2–5.9)
SODIUM BLD-SCNC: 133 MMOL/L (ref 136–145)
WBC # BLD: 10.5 K/UL (ref 4–11)

## 2021-03-20 PROCEDURE — 6370000000 HC RX 637 (ALT 250 FOR IP): Performed by: NEUROLOGICAL SURGERY

## 2021-03-20 PROCEDURE — 80048 BASIC METABOLIC PNL TOTAL CA: CPT

## 2021-03-20 PROCEDURE — 1200000000 HC SEMI PRIVATE

## 2021-03-20 PROCEDURE — 2580000003 HC RX 258: Performed by: NEUROLOGICAL SURGERY

## 2021-03-20 PROCEDURE — 83036 HEMOGLOBIN GLYCOSYLATED A1C: CPT

## 2021-03-20 PROCEDURE — 6370000000 HC RX 637 (ALT 250 FOR IP): Performed by: INTERNAL MEDICINE

## 2021-03-20 PROCEDURE — 94660 CPAP INITIATION&MGMT: CPT

## 2021-03-20 PROCEDURE — 6370000000 HC RX 637 (ALT 250 FOR IP): Performed by: NURSE PRACTITIONER

## 2021-03-20 PROCEDURE — 85027 COMPLETE CBC AUTOMATED: CPT

## 2021-03-20 PROCEDURE — 6360000002 HC RX W HCPCS: Performed by: NEUROLOGICAL SURGERY

## 2021-03-20 PROCEDURE — 36415 COLL VENOUS BLD VENIPUNCTURE: CPT

## 2021-03-20 RX ORDER — NICOTINE POLACRILEX 4 MG
15 LOZENGE BUCCAL PRN
Status: DISCONTINUED | OUTPATIENT
Start: 2021-03-20 | End: 2021-03-21

## 2021-03-20 RX ORDER — DEXTROSE MONOHYDRATE 25 G/50ML
12.5 INJECTION, SOLUTION INTRAVENOUS PRN
Status: DISCONTINUED | OUTPATIENT
Start: 2021-03-20 | End: 2021-03-21

## 2021-03-20 RX ORDER — INSULIN LISPRO 100 [IU]/ML
0-3 INJECTION, SOLUTION INTRAVENOUS; SUBCUTANEOUS NIGHTLY
Status: DISCONTINUED | OUTPATIENT
Start: 2021-03-20 | End: 2021-03-21 | Stop reason: HOSPADM

## 2021-03-20 RX ORDER — DEXTROSE MONOHYDRATE 50 MG/ML
100 INJECTION, SOLUTION INTRAVENOUS PRN
Status: DISCONTINUED | OUTPATIENT
Start: 2021-03-20 | End: 2021-03-21

## 2021-03-20 RX ORDER — INSULIN LISPRO 100 [IU]/ML
0-6 INJECTION, SOLUTION INTRAVENOUS; SUBCUTANEOUS
Status: DISCONTINUED | OUTPATIENT
Start: 2021-03-20 | End: 2021-03-21 | Stop reason: HOSPADM

## 2021-03-20 RX ADMIN — ACETAMINOPHEN 650 MG: 325 TABLET ORAL at 11:19

## 2021-03-20 RX ADMIN — METHOCARBAMOL 750 MG: 750 TABLET ORAL at 23:34

## 2021-03-20 RX ADMIN — Medication 10 ML: at 08:31

## 2021-03-20 RX ADMIN — METHOCARBAMOL 750 MG: 750 TABLET ORAL at 05:30

## 2021-03-20 RX ADMIN — DIAZEPAM 5 MG: 5 TABLET ORAL at 11:19

## 2021-03-20 RX ADMIN — POLYETHYLENE GLYCOL (3350) 17 G: 17 POWDER, FOR SOLUTION ORAL at 08:37

## 2021-03-20 RX ADMIN — OXYCODONE 10 MG: 5 TABLET ORAL at 00:20

## 2021-03-20 RX ADMIN — GABAPENTIN 300 MG: 300 CAPSULE ORAL at 15:44

## 2021-03-20 RX ADMIN — OXYCODONE 10 MG: 5 TABLET ORAL at 04:49

## 2021-03-20 RX ADMIN — DIAZEPAM 5 MG: 5 TABLET ORAL at 20:22

## 2021-03-20 RX ADMIN — CEFAZOLIN 3000 MG: 10 INJECTION, POWDER, FOR SOLUTION INTRAVENOUS at 06:56

## 2021-03-20 RX ADMIN — DOCUSATE SODIUM 50 MG AND SENNOSIDES 8.6 MG 1 TABLET: 8.6; 5 TABLET, FILM COATED ORAL at 20:21

## 2021-03-20 RX ADMIN — CEFAZOLIN 3000 MG: 10 INJECTION, POWDER, FOR SOLUTION INTRAVENOUS at 15:43

## 2021-03-20 RX ADMIN — GABAPENTIN 300 MG: 300 CAPSULE ORAL at 08:37

## 2021-03-20 RX ADMIN — Medication 10 ML: at 20:29

## 2021-03-20 RX ADMIN — PANTOPRAZOLE SODIUM 40 MG: 40 TABLET, DELAYED RELEASE ORAL at 15:44

## 2021-03-20 RX ADMIN — OXYCODONE 10 MG: 5 TABLET ORAL at 15:57

## 2021-03-20 RX ADMIN — METHOCARBAMOL 750 MG: 750 TABLET ORAL at 11:19

## 2021-03-20 RX ADMIN — CITALOPRAM HYDROBROMIDE 40 MG: 40 TABLET ORAL at 08:37

## 2021-03-20 RX ADMIN — OXYCODONE 10 MG: 5 TABLET ORAL at 20:22

## 2021-03-20 RX ADMIN — DIAZEPAM 5 MG: 5 TABLET ORAL at 04:49

## 2021-03-20 RX ADMIN — INSULIN LISPRO 1 UNITS: 100 INJECTION, SOLUTION INTRAVENOUS; SUBCUTANEOUS at 20:32

## 2021-03-20 RX ADMIN — HEPARIN SODIUM 5000 UNITS: 5000 INJECTION INTRAVENOUS; SUBCUTANEOUS at 05:30

## 2021-03-20 RX ADMIN — OXYCODONE 10 MG: 5 TABLET ORAL at 11:19

## 2021-03-20 RX ADMIN — INSULIN LISPRO 1 UNITS: 100 INJECTION, SOLUTION INTRAVENOUS; SUBCUTANEOUS at 15:44

## 2021-03-20 RX ADMIN — HEPARIN SODIUM 5000 UNITS: 5000 INJECTION INTRAVENOUS; SUBCUTANEOUS at 20:22

## 2021-03-20 RX ADMIN — GABAPENTIN 300 MG: 300 CAPSULE ORAL at 20:22

## 2021-03-20 RX ADMIN — PANTOPRAZOLE SODIUM 40 MG: 40 TABLET, DELAYED RELEASE ORAL at 05:30

## 2021-03-20 RX ADMIN — CEFAZOLIN 3000 MG: 10 INJECTION, POWDER, FOR SOLUTION INTRAVENOUS at 23:08

## 2021-03-20 RX ADMIN — ACETAMINOPHEN 650 MG: 325 TABLET ORAL at 23:34

## 2021-03-20 RX ADMIN — HEPARIN SODIUM 5000 UNITS: 5000 INJECTION INTRAVENOUS; SUBCUTANEOUS at 15:43

## 2021-03-20 RX ADMIN — HYDROMORPHONE HYDROCHLORIDE 1 MG: 1 INJECTION, SOLUTION INTRAMUSCULAR; INTRAVENOUS; SUBCUTANEOUS at 03:07

## 2021-03-20 RX ADMIN — DOCUSATE SODIUM 50 MG AND SENNOSIDES 8.6 MG 1 TABLET: 8.6; 5 TABLET, FILM COATED ORAL at 08:37

## 2021-03-20 RX ADMIN — METHOCARBAMOL 750 MG: 750 TABLET ORAL at 18:04

## 2021-03-20 RX ADMIN — METHOCARBAMOL 750 MG: 750 TABLET ORAL at 00:21

## 2021-03-20 RX ADMIN — HYDROMORPHONE HYDROCHLORIDE 1 MG: 1 INJECTION, SOLUTION INTRAMUSCULAR; INTRAVENOUS; SUBCUTANEOUS at 08:37

## 2021-03-20 RX ADMIN — INSULIN LISPRO 1 UNITS: 100 INJECTION, SOLUTION INTRAVENOUS; SUBCUTANEOUS at 17:03

## 2021-03-20 ASSESSMENT — PAIN - FUNCTIONAL ASSESSMENT
PAIN_FUNCTIONAL_ASSESSMENT: PREVENTS OR INTERFERES SOME ACTIVE ACTIVITIES AND ADLS
PAIN_FUNCTIONAL_ASSESSMENT: ACTIVITIES ARE NOT PREVENTED
PAIN_FUNCTIONAL_ASSESSMENT: PREVENTS OR INTERFERES SOME ACTIVE ACTIVITIES AND ADLS

## 2021-03-20 ASSESSMENT — PAIN DESCRIPTION - PAIN TYPE
TYPE: ACUTE PAIN;SURGICAL PAIN
TYPE: SURGICAL PAIN
TYPE: ACUTE PAIN

## 2021-03-20 ASSESSMENT — PAIN DESCRIPTION - DESCRIPTORS
DESCRIPTORS: ACHING;TIGHTNESS
DESCRIPTORS: CONSTANT;ACHING;TIGHTNESS
DESCRIPTORS: ACHING

## 2021-03-20 ASSESSMENT — PAIN DESCRIPTION - ORIENTATION
ORIENTATION: UPPER;MID
ORIENTATION: POSTERIOR
ORIENTATION: ANTERIOR

## 2021-03-20 ASSESSMENT — PAIN SCALES - GENERAL
PAINLEVEL_OUTOF10: 6
PAINLEVEL_OUTOF10: 8
PAINLEVEL_OUTOF10: 0
PAINLEVEL_OUTOF10: 5
PAINLEVEL_OUTOF10: 4
PAINLEVEL_OUTOF10: 7

## 2021-03-20 ASSESSMENT — PAIN DESCRIPTION - FREQUENCY
FREQUENCY: CONTINUOUS

## 2021-03-20 ASSESSMENT — PAIN DESCRIPTION - ONSET
ONSET: GRADUAL
ONSET: ON-GOING
ONSET: ON-GOING

## 2021-03-20 ASSESSMENT — PAIN DESCRIPTION - LOCATION
LOCATION: NECK
LOCATION: HEAD
LOCATION: NECK
LOCATION: NECK

## 2021-03-20 ASSESSMENT — PAIN DESCRIPTION - PROGRESSION
CLINICAL_PROGRESSION: NOT CHANGED
CLINICAL_PROGRESSION: GRADUALLY WORSENING

## 2021-03-20 NOTE — PROGRESS NOTES
Patient is A/O x4, VSS, and pain is being managed per the [facility]. Patient is tolerating PO liquids and diet well. Patient is ambulating to the bathroom to void with SBA. Patient's incision is CDI and drains are continuing to return serosanguinous fluid. Both drains are compressed. Fall precautions remain in place, will continue to monitor.

## 2021-03-20 NOTE — PROGRESS NOTES
Hospitalist Progress Note      PCP: Sylvia Wells PA-C    Date of Admission: 3/18/2021    Chief Complaint: Postop medical management    Hospital Course: Patient with history of obesity, hyperlipidemia admitted for C3-4, C4-5, C5-6, C6-7, T1-T2 FUSION AND FIXATION, C6-C7, T1-T2 DECOMPRESSION       Subjective: Still complaining of some neck pain. Pain medications are helping. Denies any numbness tingling. Having a bowel movement. No trouble with urination. Denies any chest pain, shortness of breath. Also complaining of some headache was on CPAP intermittently at night  Noted to be have hyperglycemia. Medications:  Reviewed    Infusion Medications    dextrose       Scheduled Medications    insulin lispro  0-6 Units Subcutaneous TID WC    insulin lispro  0-3 Units Subcutaneous Nightly    methocarbamol  750 mg Oral 4 times per day    gabapentin  300 mg Oral TID    fluticasone  1 spray Each Nostril Daily    pantoprazole  40 mg Oral BID AC    sodium chloride flush  10 mL Intravenous 2 times per day    ceFAZolin (ANCEF) IVPB  3,000 mg Intravenous Q8H    sennosides-docusate sodium  1 tablet Oral BID    polyethylene glycol  17 g Oral Daily    heparin (porcine)  5,000 Units Subcutaneous Q8H    citalopram  40 mg Oral Daily     PRN Meds: glucose, dextrose, glucagon (rDNA), dextrose, sodium chloride flush, promethazine **OR** ondansetron, acetaminophen, oxyCODONE **OR** oxyCODONE, HYDROmorphone **OR** HYDROmorphone, diazePAM, bisacodyl      Intake/Output Summary (Last 24 hours) at 3/20/2021 1149  Last data filed at 3/20/2021 0849  Gross per 24 hour   Intake 480 ml   Output 260 ml   Net 220 ml       Physical Exam Performed:    BP (!) 156/90   Pulse 97   Temp 98.1 °F (36.7 °C) (Oral)   Resp 16   Ht 5' 11\" (1.803 m)   Wt 290 lb (131.5 kg)   SpO2 92%   BMI 40.45 kg/m²     General appearance: No apparent distress, appears stated age and cooperative.   HEENT: Pupils equal, round, and reactive to light. Conjunctivae/corneas clear. Neck: Supple, with full range of motion. No jugular venous distention. Trachea midline. Respiratory:  Normal respiratory effort. Clear to auscultation, bilaterally without Rales/Wheezes/Rhonchi. Cardiovascular: Regular rate and rhythm with normal S1/S2 without murmurs, rubs or gallops. Abdomen: Soft, non-tender, non-distended with normal bowel sounds. Musculoskeletal: Surgical site C/D/I have Hemovac drain x2  Neurologic:  Neurovascularly intact without any focal sensory/motor deficits. Cranial nerves: II-XII intact, grossly non-focal.  Psychiatric: Alert and oriented, thought content appropriate, normal insight  Capillary Refill: Brisk,< 3 seconds   Peripheral Pulses: +2 palpable, equal bilaterally       Labs:   Recent Labs     03/19/21  0503 03/20/21  0619   WBC 14.0* 10.5   HGB 13.0* 12.6*   HCT 38.0* 35.9*    247     Recent Labs     03/19/21  0503 03/20/21  0619   * 133*   K 4.2 3.9   CL 99 99   CO2 23 25   BUN 11 11   CREATININE 0.7* 0.7*   CALCIUM 8.8 8.4     No results for input(s): AST, ALT, BILIDIR, BILITOT, ALKPHOS in the last 72 hours. Recent Labs     03/18/21  1045   INR 1.09     No results for input(s): Parminder Cobble in the last 72 hours. Urinalysis:    No results found for: Wilhemena Aliyah, BACTERIA, RBCUA, BLOODU, SPECGRAV, [address]    Radiology:  XR CERVICAL SPINE (2-3 VIEWS)   Final Result   Impression: Postsurgical changes of the cervical spine. CT GUIDED STEREOTACTIC LOCALIZATION   Final Result   1. Intraoperative CT imaging obtained for stereotactic intraoperative localization purposes as described. No acute complication identified. CT GUIDED STEREOTACTIC LOCALIZATION   Final Result   1. Intraoperative CT imaging obtained for stereotactic intraoperative localization purposes as described. No acute complication identified.               Assessment/Plan:    Active Hospital Problems    Diagnosis Date Noted    S/P cervical spinal fusion [Z98.1] 03/19/2021    Cervical spondylosis with radiculopathy [M47.22] 03/18/2021     #Elevated blood pressure likely secondary to pain will monitor    #Hyperglycemia  We will check hemoglobin A1c. Carb controlled diet. Humalog sliding scale. #NICKI on CPAP    #S/p  Back surgery POD #2  Pain management per primary    DVT Prophylaxis: Subcu heparin  Diet: DIET GENERAL; Carb Control: 3 carb choices (45 gms)/meal  Code Status: Full Code    PT/OT Eval Status: In progress    Dispo -per primary.   No barriers from medicine for discharge    This chart was likely completed using voice recognition technology and may contain unintended grammatical , phraseology,and/or punctuation errors      Hay Simmons MD

## 2021-03-20 NOTE — PROGRESS NOTES
Patient currently resting in chair at bedside. Alert and oriented x 4. Patient is being given PRN Oxycodone, Valium, and Dilaudid. Pain level is tolerable. Patient in no acute distress. Chair alarm on and fall precautions taken. Nurse will continue to monitor/reassess. Call light within reach.  Sandy Garcia

## 2021-03-20 NOTE — PROGRESS NOTES
Neurosurgery Progress Note    3/20/2021 9:31 AM                               Jenniferaubrie Chantal Hester                      LOS: 2 days             Interval History:  Post-operative Day# 2 s/p Procedure(s) (LRB):  C3-4, C4-5, C5-6, C6-7, T1-T2 FUSION AND FIXATION, C6-C7 DECOMPRESSION (N/A)  Subjective:  No acute events overnight. Patient c/o neck pain. Feels it is slightly beter today. Denies UE radic                                                Physical Exam:    Temp (24hrs), Av.1 °F (36.7 °C), Min:97.7 °F (36.5 °C), Max:98.2 °F (36.8 °C)      Neuro Exam  The patient is well-appearing and is in no acute distress. Alert and oriented ×4, appropriate, conversant with normal mood and affect. DTR 2+/4 symmetric. Judy sign neg BUE, Babinski sign is down-going BLE      RUE: Deltoids: 5/5, Triceps: 5/5, Biceps: 5/5, WE/WF: 5/5, Finger abd: 5/5, : 5/5   LUE: Deltoids: 5/5, Triceps: 5/5, Biceps: 5/5, WE/WF: 5/5, Finger abd: 5/5, : 5/5  LLE: HE: 5/5, HF: 5/5, KE:5/5, KF: 5/5, PF: 5/5, DF: 5/5  RLE: HE: 5/5, HF: 5/5, KE:5/5, KF: 5/5, PF: 5/5, DF: 5/5    Labs:  Recent Labs     21  0619   WBC 10.5   HGB 12.6*   HCT 35.9*          Recent Labs     21  0619   *   K 3.9   CL 99   CO2 25   BUN 11   CREATININE 0.7*   GLUCOSE 205*   CALCIUM 8.4       Recent Labs     21  1045   PROTIME 12.6   INR 1.09   APTT 30.7     Drain L 165 R 95    Assessment and Plan:  Patient is a 47 yo M s/p Procedure  C3-4, C4-5, C5-6, C6-7, T1-T2 FUSION AND FIXATION, C6-C7 DECOMPRESSION (N/A) per   Plan:  1. Neurologic exam frequency:q4  2. Mobility:PT/OT eval  3. Drain:continue  4. DVT Prophylaxis: SCDs   5. GI Prophylaxis:scd's and heparin sq  6. Bowel Regimen: glycolax and senna  7. Pain control:roxiand robaxin and valium  8. Incisional Care:open to air  9.  Dispo Planning:Discarge once drain slows down, and pain under control prob      Alan Rodriguez PA-C

## 2021-03-21 VITALS
RESPIRATION RATE: 14 BRPM | BODY MASS INDEX: 40.6 KG/M2 | OXYGEN SATURATION: 96 % | WEIGHT: 290 LBS | TEMPERATURE: 98.2 F | SYSTOLIC BLOOD PRESSURE: 137 MMHG | HEIGHT: 71 IN | HEART RATE: 104 BPM | DIASTOLIC BLOOD PRESSURE: 85 MMHG

## 2021-03-21 LAB
ESTIMATED AVERAGE GLUCOSE: 154.2 MG/DL
GLUCOSE BLD-MCNC: 207 MG/DL (ref 70–99)
GLUCOSE BLD-MCNC: 215 MG/DL (ref 70–99)
HBA1C MFR BLD: 7 %
PERFORMED ON: ABNORMAL
PERFORMED ON: ABNORMAL

## 2021-03-21 PROCEDURE — 2580000003 HC RX 258: Performed by: NEUROLOGICAL SURGERY

## 2021-03-21 PROCEDURE — 94660 CPAP INITIATION&MGMT: CPT

## 2021-03-21 PROCEDURE — 6370000000 HC RX 637 (ALT 250 FOR IP): Performed by: NEUROLOGICAL SURGERY

## 2021-03-21 PROCEDURE — 6370000000 HC RX 637 (ALT 250 FOR IP): Performed by: INTERNAL MEDICINE

## 2021-03-21 PROCEDURE — 6370000000 HC RX 637 (ALT 250 FOR IP): Performed by: NURSE PRACTITIONER

## 2021-03-21 PROCEDURE — 6360000002 HC RX W HCPCS: Performed by: NEUROLOGICAL SURGERY

## 2021-03-21 RX ORDER — OXYCODONE HYDROCHLORIDE 10 MG/1
10 TABLET ORAL EVERY 4 HOURS PRN
Qty: 42 TABLET | Refills: 0 | Status: SHIPPED | OUTPATIENT
Start: 2021-03-21 | End: 2021-03-28

## 2021-03-21 RX ORDER — METHOCARBAMOL 750 MG/1
750 TABLET, FILM COATED ORAL 4 TIMES DAILY
Qty: 40 TABLET | Refills: 0 | Status: SHIPPED | OUTPATIENT
Start: 2021-03-21 | End: 2021-03-31

## 2021-03-21 RX ORDER — DIAZEPAM 5 MG/1
5 TABLET ORAL EVERY 6 HOURS PRN
Qty: 40 TABLET | Refills: 0 | Status: SHIPPED | OUTPATIENT
Start: 2021-03-21 | End: 2021-03-31

## 2021-03-21 RX ORDER — NICOTINE POLACRILEX 4 MG
15 LOZENGE BUCCAL PRN
Status: DISCONTINUED | OUTPATIENT
Start: 2021-03-21 | End: 2021-03-21 | Stop reason: HOSPADM

## 2021-03-21 RX ORDER — DEXTROSE MONOHYDRATE 25 G/50ML
12.5 INJECTION, SOLUTION INTRAVENOUS PRN
Status: DISCONTINUED | OUTPATIENT
Start: 2021-03-21 | End: 2021-03-21 | Stop reason: HOSPADM

## 2021-03-21 RX ORDER — DEXTROSE MONOHYDRATE 50 MG/ML
100 INJECTION, SOLUTION INTRAVENOUS PRN
Status: DISCONTINUED | OUTPATIENT
Start: 2021-03-21 | End: 2021-03-21 | Stop reason: HOSPADM

## 2021-03-21 RX ADMIN — GABAPENTIN 300 MG: 300 CAPSULE ORAL at 15:03

## 2021-03-21 RX ADMIN — OXYCODONE 10 MG: 5 TABLET ORAL at 01:43

## 2021-03-21 RX ADMIN — METHOCARBAMOL 750 MG: 750 TABLET ORAL at 11:50

## 2021-03-21 RX ADMIN — CEFAZOLIN 3000 MG: 10 INJECTION, POWDER, FOR SOLUTION INTRAVENOUS at 05:44

## 2021-03-21 RX ADMIN — INSULIN LISPRO 2 UNITS: 100 INJECTION, SOLUTION INTRAVENOUS; SUBCUTANEOUS at 11:50

## 2021-03-21 RX ADMIN — CITALOPRAM HYDROBROMIDE 40 MG: 40 TABLET ORAL at 09:06

## 2021-03-21 RX ADMIN — OXYCODONE 10 MG: 5 TABLET ORAL at 05:44

## 2021-03-21 RX ADMIN — DOCUSATE SODIUM 50 MG AND SENNOSIDES 8.6 MG 1 TABLET: 8.6; 5 TABLET, FILM COATED ORAL at 09:06

## 2021-03-21 RX ADMIN — DIAZEPAM 5 MG: 5 TABLET ORAL at 09:59

## 2021-03-21 RX ADMIN — METFORMIN HYDROCHLORIDE 500 MG: 500 TABLET ORAL at 12:35

## 2021-03-21 RX ADMIN — Medication 10 ML: at 08:02

## 2021-03-21 RX ADMIN — POLYETHYLENE GLYCOL (3350) 17 G: 17 POWDER, FOR SOLUTION ORAL at 09:06

## 2021-03-21 RX ADMIN — GABAPENTIN 300 MG: 300 CAPSULE ORAL at 09:06

## 2021-03-21 RX ADMIN — PANTOPRAZOLE SODIUM 40 MG: 40 TABLET, DELAYED RELEASE ORAL at 05:44

## 2021-03-21 RX ADMIN — METHOCARBAMOL 750 MG: 750 TABLET ORAL at 05:44

## 2021-03-21 RX ADMIN — OXYCODONE 10 MG: 5 TABLET ORAL at 15:48

## 2021-03-21 RX ADMIN — OXYCODONE 10 MG: 5 TABLET ORAL at 09:59

## 2021-03-21 RX ADMIN — CEFAZOLIN 3000 MG: 10 INJECTION, POWDER, FOR SOLUTION INTRAVENOUS at 15:04

## 2021-03-21 RX ADMIN — INSULIN LISPRO 2 UNITS: 100 INJECTION, SOLUTION INTRAVENOUS; SUBCUTANEOUS at 07:43

## 2021-03-21 RX ADMIN — HEPARIN SODIUM 5000 UNITS: 5000 INJECTION INTRAVENOUS; SUBCUTANEOUS at 05:44

## 2021-03-21 RX ADMIN — DIAZEPAM 5 MG: 5 TABLET ORAL at 15:48

## 2021-03-21 ASSESSMENT — PAIN DESCRIPTION - DESCRIPTORS
DESCRIPTORS: ACHING
DESCRIPTORS: ACHING

## 2021-03-21 ASSESSMENT — PAIN - FUNCTIONAL ASSESSMENT
PAIN_FUNCTIONAL_ASSESSMENT: PREVENTS OR INTERFERES SOME ACTIVE ACTIVITIES AND ADLS
PAIN_FUNCTIONAL_ASSESSMENT: PREVENTS OR INTERFERES SOME ACTIVE ACTIVITIES AND ADLS

## 2021-03-21 ASSESSMENT — PAIN SCALES - GENERAL
PAINLEVEL_OUTOF10: 8
PAINLEVEL_OUTOF10: 7
PAINLEVEL_OUTOF10: 5
PAINLEVEL_OUTOF10: 0
PAINLEVEL_OUTOF10: 8
PAINLEVEL_OUTOF10: 0

## 2021-03-21 ASSESSMENT — PAIN DESCRIPTION - ORIENTATION
ORIENTATION: POSTERIOR
ORIENTATION: POSTERIOR

## 2021-03-21 ASSESSMENT — PAIN DESCRIPTION - LOCATION
LOCATION: BACK;NECK
LOCATION: NECK

## 2021-03-21 ASSESSMENT — PAIN DESCRIPTION - PROGRESSION: CLINICAL_PROGRESSION: GRADUALLY WORSENING

## 2021-03-21 NOTE — PROGRESS NOTES
Patient currently resting in chair at bedside. Alert and oriented x 4. Patient being given PRN oxycodone for pain with effective results. Patient in no distress. Chair alarm on and fall precautions taken. Nurse will continue to monitor/reassess. Call light within reach.   Sang Rodriguez

## 2021-03-21 NOTE — PROGRESS NOTES
Patient discharged. Taken to private vehicle via wheelchair. Both IV's discontinued. Tip intact and 2 x 2 pressure dressing applied. Verbal and written discharge instructions given to patient and spouse. Both verbalize understanding. Written prescriptions also given. Patient belongings sent with patient.   Andrew Corrigan

## 2021-03-21 NOTE — PLAN OF CARE
Problem: Pain:  Goal: Pain level will decrease  Description: Pain level will decrease  3/21/2021 0739 by Maik Lyle RN  Outcome: Ongoing  3/21/2021 0114 by Rogelio Mckinney RN  Outcome: Ongoing     Problem: Falls - Risk of:  Goal: Will remain free from falls  Description: Will remain free from falls  3/21/2021 0739 by Maik Lyle RN  Outcome: Ongoing  3/21/2021 0114 by Rogelio Mckinney RN  Outcome: Ongoing

## 2021-03-21 NOTE — PROGRESS NOTES
Neurosurgery Progress Note    3/21/2021 10:28 AM                               Karla Hester                      LOS: 3 days             Post-operative Day# 3 s/p Procedure(s) (LRB):  C3-4, C4-5, C5-6, C6-7, T1-T2 FUSION AND FIXATION, C6-C7 DECOMPRESSION (  Subjective:  No acute events overnight. Patient has no specific complaints this am. Left arm pain better. Physical Exam:    Temp (24hrs), Av.2 °F (36.8 °C), Min:97.6 °F (36.4 °C), Max:99 °F (37.2 °C)      Neuro Exam  The patient is well-appearing and is in no acute distress. Alert and oriented ×4, appropriate, conversant with normal mood and affect. DTR 2+/4 symmetric. Judy sign neg BUE, Babinski sign is down-going BLE      RUE: Deltoids: 5/5, Triceps: 5/5, Biceps: 5/5, WE/WF: 5/5, Finger abd: 5/5, : 5/5   LUE: Deltoids: 5/5, Triceps: 5/5, Biceps: 5/5, WE/WF: 5/5, Finger abd: 5/5, : 5/5  LLE: HE: 5/5, HF: 5/5, KE:5/5, KF: 5/5, PF: 5/5, DF: 5/5  RLE: HE: 5/5, HF: 5/5, KE:5/5, KF: 5/5, PF: 5/5, DF: 5/5    Labs:  Recent Labs     21  0619   WBC 10.5   HGB 12.6*   HCT 35.9*          Recent Labs     21  0619   *   K 3.9   CL 99   CO2 25   BUN 11   CREATININE 0.7*   GLUCOSE 205*   CALCIUM 8.4       Recent Labs     21  1045   PROTIME 12.6   INR 1.09   APTT 30.7   Drain 125 in 24 hours. Assessment and Plan:  Patient is D 29 SL M s/p Procedure  C3-4, C4-5, C5-6, C6-7, T1-T2 FUSION AND FIXATION, C6-C7 DECOMPRESSION (N/A) per   Plan: Will dc home later today if drain output is less than 30 in this shift. 1. Neurologic exam frequency:q4  2. Mobility:PT/OT eval  3. Drain\" pull if less than 30 ml in this shift of output. Otherwise keep it in and will dc home tomorrow   4. DVT Prophylaxis: SCDs   5. GI Prophylaxis:scd's and heparin sq  6. Bowel Regimen: glycolax and senna  7. Pain control:roxiand robaxin and valium  8.  Incisional Care:open to air        Marylou De Jesus Karlee Mckeon PA-C

## 2021-03-21 NOTE — PROGRESS NOTES
Hospitalist Progress Note      PCP: Joyce Valladares PA-C    Date of Admission: 3/18/2021    Chief Complaint: Postop medical management    Hospital Course: Patient with history of obesity, hyperlipidemia admitted for C3-4, C4-5, C5-6, C6-7, T1-T2 FUSION AND FIXATION, C6-C7, T1-T2 DECOMPRESSION       Subjective: When I can go home. Headache is better. Neck pain is getting better. Denies any numbness or tingling. Medications:  Reviewed    Infusion Medications    dextrose       Scheduled Medications    metFORMIN  500 mg Oral Daily with breakfast    insulin lispro  0-6 Units Subcutaneous TID WC    insulin lispro  0-3 Units Subcutaneous Nightly    methocarbamol  750 mg Oral 4 times per day    gabapentin  300 mg Oral TID    fluticasone  1 spray Each Nostril Daily    pantoprazole  40 mg Oral BID AC    sodium chloride flush  10 mL Intravenous 2 times per day    ceFAZolin (ANCEF) IVPB  3,000 mg Intravenous Q8H    sennosides-docusate sodium  1 tablet Oral BID    polyethylene glycol  17 g Oral Daily    heparin (porcine)  5,000 Units Subcutaneous Q8H    citalopram  40 mg Oral Daily     PRN Meds: glucose, dextrose, glucagon (rDNA), dextrose, sodium chloride flush, promethazine **OR** ondansetron, acetaminophen, oxyCODONE **OR** oxyCODONE, HYDROmorphone **OR** HYDROmorphone, diazePAM, bisacodyl      Intake/Output Summary (Last 24 hours) at 3/21/2021 1055  Last data filed at 3/21/2021 0816  Gross per 24 hour   Intake 540 ml   Output 80 ml   Net 460 ml       Physical Exam Performed:    BP (!) 135/95   Pulse 95   Temp 98.1 °F (36.7 °C) (Oral)   Resp 18   Ht 5' 11\" (1.803 m)   Wt 290 lb (131.5 kg)   SpO2 97%   BMI 40.45 kg/m²     General appearance: NAD, up in the chair HEENT: Pupils equal, round, and reactive to light. Conjunctivae/corneas clear. Neck: Supple, with full range of motion. No jugular venous distention. Trachea midline. Respiratory:  Normal respiratory effort.  Clear to auscultation, bilaterally without Rales/Wheezes/Rhonchi. Cardiovascular: Regular rate and rhythm with normal S1/S2 without murmurs, rubs or gallops. Abdomen: Soft, non-tender, non-distended with normal bowel sounds. Musculoskeletal: Surgical site C/D/I have Hemovac drain x2  Neurologic:  Neurovascularly intact without any focal sensory/motor deficits. Cranial nerves: II-XII intact, grossly non-focal.  Psychiatric: Alert and oriented, thought content appropriate, normal insight  Capillary Refill: Brisk,< 3 seconds   Peripheral Pulses: +2 palpable, equal bilaterally       Labs:   Recent Labs     03/19/21  0503 03/20/21  0619   WBC 14.0* 10.5   HGB 13.0* 12.6*   HCT 38.0* 35.9*    247     Recent Labs     03/19/21  0503 03/20/21  0619   * 133*   K 4.2 3.9   CL 99 99   CO2 23 25   BUN 11 11   CREATININE 0.7* 0.7*   CALCIUM 8.8 8.4     No results for input(s): AST, ALT, BILIDIR, BILITOT, ALKPHOS in the last 72 hours. No results for input(s): INR in the last 72 hours. No results for input(s): Becky Burdock in the last 72 hours. Urinalysis:    No results found for: Creasie Shelton, BACTERIA, RBCUA, BLOODU, SPECGRAV, Michael [address]    Radiology:  XR CERVICAL SPINE (2-3 VIEWS)   Final Result   Impression: Postsurgical changes of the cervical spine. CT GUIDED STEREOTACTIC LOCALIZATION   Final Result   1. Intraoperative CT imaging obtained for stereotactic intraoperative localization purposes as described. No acute complication identified. CT GUIDED STEREOTACTIC LOCALIZATION   Final Result   1. Intraoperative CT imaging obtained for stereotactic intraoperative localization purposes as described. No acute complication identified.               Assessment/Plan:    Active Hospital Problems    Diagnosis Date Noted    S/P cervical spinal fusion [Z98.1] 03/19/2021    Cervical spondylosis with radiculopathy [M47.22] 03/18/2021     #Elevated blood pressure likely secondary to pain will monitor    #Hyperglycemia 2/2 diabetes mellitus type 2 new diagnosis  Hemoglobin A1c 7.0 3/2021. Patient is a started on Metformin 500mg he was advised to take it for 1week. potential side effects discussed with patient. He was also given education regarding diabetes mellitus type 2. #NICKI on CPAP    #S/p  Back surgery POD #3  Pain management per primary    DVT Prophylaxis: Subcu heparin  Diet: DIET GENERAL; Carb Control: 3 carb choices (45 gms)/meal  Code Status: Full Code    PT/OT Eval Status: In progress    Dispo -per primary.   No barriers from medicine for discharge follow-up with PCP in 1 week regarding diabetes mellitus management    This chart was likely completed using voice recognition technology and may contain unintended grammatical , phraseology,and/or punctuation errors      Kumar Bansal MD

## 2021-03-22 ENCOUNTER — CARE COORDINATION (OUTPATIENT)
Dept: CASE MANAGEMENT | Age: 56
End: 2021-03-22

## 2021-03-22 NOTE — CARE COORDINATION
Date/Time:  3/22/2021 9:26 AM  Attempted to reach patient by telephone. Call within 2 business days of discharge: Yes Left HIPPA compliant message requesting a return call. Will attempt to reach patient again.     Thank Smitha Wallis RN  Care Transition Coordinator  Contact [facility]:[phone]

## 2021-03-22 NOTE — CARE COORDINATION
Patient contacted regarding Sruendra Dinh. Care Transition Nurse contacted the patient by telephone to perform post discharge assessment. Call within 2 business days of discharge: Yes. Verified name and  with patient as identifiers. Provided introduction to self, and explanation of the CTN/ACM role, and reason for call due to risk factors for infection and/or exposure to COVID-19. Symptoms reviewed with patient who verbalized the following symptoms: pain or aching joints. Due to no new or worsening symptoms encounter was not routed to provider for escalation. Discussed follow-up appointments. If no appointment was previously scheduled, appointment scheduling offered: Yes  [facility] follow up appointment(s): No future appointments. Non-face-to-face services provided:  Obtained and reviewed discharge summary and/or continuity of care documents  Education of patient/family/caregiver/guardian to support self-management-. Assessment and support for treatment adherence and medication management-. Advance Care Planning:   Does patient have an Advance Directive:  not on file. Patient has following risk factors of: no known risk factors. CTN reviewed discharge instructions, medical action plan and red flags such as increased shortness of breath, increasing fever and signs of decompensation with patient who verbalized understanding. Discussed exposure protocols and quarantine with CDC Guidelines What to do if you are sick with coronavirus disease .  Patient was given an opportunity for questions and concerns. The patient agrees to contact the [facility] exposure line [phone], [facility] department [place] Department of Health: ([phone]) and PCP office for questions related to their healthcare. CTN provided contact information for future needs. Reviewed and educated patient on any new and changed medications related to discharge diagnosis     Was patient discharged with a pulse oximeter? No Discussed and confirmed pulse oximeter discharge instructions and when to notify provider or seek emergency care. Summary  CTN spoke with patient this am for initial COVID -19 monitoring call. Patient with no reports of any fever, chills, nausea, vomiting, chest pain, SOB or cough. Pain is present, due to procedure, instructed to continue to take PRN's around the clock, as prescribed at least the first 24-48 hours, to stay ahead of pain. CTN encouraged patient to continue to monitor himself for any of the above s/s, reporting any that may present to MD immediately, rest as needed and take all medications as prescribed. Plan for follow up call in 7-14 days based on severity of symptoms and risk factors.     Thank Liu Rasheed RN  Care Transition Coordinator  Contact [facility]:[phone]

## 2021-04-05 ENCOUNTER — CARE COORDINATION (OUTPATIENT)
Dept: CASE MANAGEMENT | Age: 56
End: 2021-04-05

## 2021-04-05 NOTE — CARE COORDINATION
You Patient resolved from the Care Transitions episode on 04/05/2021    Patient/family has been provided the following resources and education related to COVID-19:                         Signs, symptoms and red flags related to COVID-19            CDC exposure and quarantine guidelines            [facility] exposure contact - [phone]            Contact for their local Department of Health                 Patient currently reports that the following symptoms have improved:  no new/worsening symptoms. Patient with no reports of any fever, chills, nausea, vomiting, chest pain, SOB or cough, since returning home. Patient has had follow up with MD as well. No other issues or concerns at this time. No further outreach scheduled with this CTN/ACM. Episode of Care resolved. Patient has this CTN/ACM contact information if future needs arise.     Thank Hilaria Schilder, RN  Care Transition Coordinator  Contact [facility]:[phone]

## 2021-04-29 NOTE — DISCHARGE SUMMARY
NEURONTIN     loratadine 10 MG tablet  Commonly known as: CLARITIN        STOP taking these medications    mometasone 50 MCG/ACT nasal spray  Commonly known as: NASONEX     therapeutic multivitamin-minerals tablet        ASK your doctor about these medications    diazePAM 5 MG tablet  Commonly known as: VALIUM  Take 1 tablet by mouth every 6 hours as needed for Anxiety (Muscle spasms) for up to 10 days. Ask about: Should I take this medication? methocarbamol 750 MG tablet  Commonly known as: ROBAXIN  Take 1 tablet by mouth 4 times daily for 10 days  Ask about: Should I take this medication?     oxyCODONE HCl 10 MG immediate release tablet  Commonly known as: OXY-IR  Take 1 tablet by mouth every 4 hours as needed for Pain for up to 7 days. Ask about: Should I take this medication? Where to Get Your Medications      These medications were sent to [facility], [address] [facility]  [address]    Phone: [phone]   · metFORMIN 500 MG tablet     You can get these medications from any pharmacy    Bring a paper prescription for each of these medications  · diazePAM 5 MG tablet  · methocarbamol 750 MG tablet  · oxyCODONE HCl 10 MG immediate release tablet       Discharge Destination: The patient was discharged to Home. Follow-up: The patient is to follow-up with Shawn Chopra in the office in 2 weeks   Discharge Instructions: Verbal and written discharge instructions as well as dressing change instructions were given to the patient at the time of consent. No NSAIDS or anticoagulation for 1 week post-operatively. No driving or riding in a motor vehicle. No lifting or bending.       Yaron Bailey PA-C

## 2021-06-02 ENCOUNTER — CLINICAL DOCUMENTATION (OUTPATIENT)
Dept: OTHER | Age: 56
End: 2021-06-02

## (undated) DEVICE — TRAY CATHETER 16FR F INCLUDE BARDX IC COMPLT CARE DRNGE BG

## (undated) DEVICE — SUTURE MCRYL SZ 4-0 L27IN ABSRB UD L19MM PS-2 1/2 CIR PRIM Y426H

## (undated) DEVICE — TOWEL,OR,DSP,ST,BLUE,DLX,8/PK,10PK/CS: Brand: MEDLINE

## (undated) DEVICE — TOOL 14MH30 LEGEND 14CM 3MM: Brand: MIDAS REX ™

## (undated) DEVICE — GLOVE SURG SZ 75 L12IN FNGR THK94MIL TRNSLUC YEL LTX

## (undated) DEVICE — ELECTROSURGICAL PENCIL ROCKER SWITCH NON COATED BLADE ELECTRODE 10 FT (3 M) CORD HOLSTER: Brand: MEGADYNE

## (undated) DEVICE — UNDERGLOVE SURG SZ 8 BLU LTX FREE SYN POLYISOPRENE POLYMER

## (undated) DEVICE — SOLUTION IV 1000ML 0.9% SOD CHL

## (undated) DEVICE — PLATE ES AD W 9FT CRD 2

## (undated) DEVICE — JEWISH HOSPITAL TURNOVER KIT: Brand: MEDLINE INDUSTRIES, INC.

## (undated) DEVICE — 3M™ STERI-DRAPE™ INSTRUMENT POUCH 1018: Brand: STERI-DRAPE™

## (undated) DEVICE — TAP SURG 3.5MM STD THRD MTL TIP SYM

## (undated) DEVICE — AEGIS 1" DISK 4MM HOLE, PEEL OPEN: Brand: MEDLINE

## (undated) DEVICE — DRESSING GERM DIA1IN CNTR H DIA7MM BLU CHG ANTIMIC PROTCT

## (undated) DEVICE — ELECTRODE NERVE STIM FOR SPNL CRD MONITORING

## (undated) DEVICE — KIT EVAC 0.13IN RECT TB DIA10FR 400CC PVC 3 SPR Y CONN DRN

## (undated) DEVICE — LAMINECTOMY PK

## (undated) DEVICE — SYRINGE IRRIG 60ML SFT PLIABLE BLB EZ TO GRP 1 HND USE W/

## (undated) DEVICE — CABLE BPLR L12FT FLYING LD DISPOSABLE

## (undated) DEVICE — NEURO SPONGES: Brand: DEROYAL

## (undated) DEVICE — COVER LT HNDL CAM BLU DISP W/ SURG CTRL

## (undated) DEVICE — BIT DRL LNG 2.4MM F/3.5MM SCREW

## (undated) DEVICE — APPLICATOR MEDICATED 26 CC SOLUTION HI LT ORNG CHLORAPREP

## (undated) DEVICE — SURGICAL SET UP - SURE SET: Brand: MEDLINE INDUSTRIES, INC.

## (undated) DEVICE — CONTAINER,SPECIMEN,PNEU TUBE,3OZ,OR STRL: Brand: MEDLINE

## (undated) DEVICE — SUTURE VCRL SZ 3-0 L18IN ABSRB UD L26MM SH 1/2 CIR J864D

## (undated) DEVICE — SPONGE,NEURO,0.5"X3",XR,STRL,LF,10/PK: Brand: MEDLINE

## (undated) DEVICE — STAPLER SKIN H3.9MM WIRE DIA0.58MM CRWN 6.9MM 35 STPL ROT

## (undated) DEVICE — SUTURE NONABSORBABLE MONOFILAMENT 2-0 FS 18 IN ETHILON 664H

## (undated) DEVICE — 3M™ IOBAN™ 2 ANTIMICROBIAL INCISE DRAPE 6640EZ: Brand: IOBAN™ 2

## (undated) DEVICE — 3M™ DURAPORE™ SURGICAL TAPE 1538-3, 3 INCH X 10 YARD (7,5CM X 9,1M), 4 ROLLS/BOX: Brand: 3M™ DURAPORE™

## (undated) DEVICE — SHEET,DRAPE,53X77,STERILE: Brand: MEDLINE

## (undated) DEVICE — SUTURE VCRL SZ 0 L18IN ABSRB UD L36MM CT-1 1/2 CIR J840D

## (undated) DEVICE — WAX SURG 2.5GM HEMSTAT BNE BEESWAX PARAFFIN ISO PALMITATE

## (undated) DEVICE — SURE SET-DOUBLE BASIN-LF: Brand: MEDLINE INDUSTRIES, INC.

## (undated) DEVICE — 3M™ TEGADERM™ TRANSPARENT FILM DRESSING FRAME STYLE, 1624W, 2-3/8 IN X 2-3/4 IN (6 CM X 7 CM), 100/CT 4CT/CASE: Brand: 3M™ TEGADERM™

## (undated) DEVICE — GARMENT,MEDLINE,DVT,INT,CALF,MED, GEN2: Brand: MEDLINE